# Patient Record
Sex: MALE | Race: WHITE | NOT HISPANIC OR LATINO | Employment: OTHER | URBAN - METROPOLITAN AREA
[De-identification: names, ages, dates, MRNs, and addresses within clinical notes are randomized per-mention and may not be internally consistent; named-entity substitution may affect disease eponyms.]

---

## 2018-06-05 ENCOUNTER — LAB VISIT (OUTPATIENT)
Dept: LAB | Facility: HOSPITAL | Age: 83
End: 2018-06-05
Attending: PSYCHIATRY & NEUROLOGY
Payer: MEDICARE

## 2018-06-05 ENCOUNTER — OFFICE VISIT (OUTPATIENT)
Dept: NEUROLOGY | Facility: CLINIC | Age: 83
End: 2018-06-05
Payer: MEDICARE

## 2018-06-05 VITALS
HEART RATE: 56 BPM | SYSTOLIC BLOOD PRESSURE: 138 MMHG | RESPIRATION RATE: 16 BRPM | DIASTOLIC BLOOD PRESSURE: 80 MMHG | WEIGHT: 178.13 LBS | BODY MASS INDEX: 25.5 KG/M2 | HEIGHT: 70 IN

## 2018-06-05 DIAGNOSIS — H02.401 PTOSIS OF RIGHT EYELID: Primary | ICD-10-CM

## 2018-06-05 DIAGNOSIS — H02.401 PTOSIS OF RIGHT EYELID: ICD-10-CM

## 2018-06-05 DIAGNOSIS — R20.0 LEFT ARM NUMBNESS: ICD-10-CM

## 2018-06-05 LAB
ALBUMIN SERPL BCP-MCNC: 3.6 G/DL
ALP SERPL-CCNC: 87 U/L
ALT SERPL W/O P-5'-P-CCNC: 26 U/L
ANION GAP SERPL CALC-SCNC: 8 MMOL/L
AST SERPL-CCNC: 24 U/L
BASOPHILS # BLD AUTO: 0.01 K/UL
BASOPHILS NFR BLD: 0.2 %
BILIRUB SERPL-MCNC: 0.5 MG/DL
BUN SERPL-MCNC: 15 MG/DL
CALCIUM SERPL-MCNC: 9.6 MG/DL
CHLORIDE SERPL-SCNC: 104 MMOL/L
CK SERPL-CCNC: 132 U/L
CO2 SERPL-SCNC: 29 MMOL/L
CREAT SERPL-MCNC: 1.2 MG/DL
DIFFERENTIAL METHOD: ABNORMAL
EOSINOPHIL # BLD AUTO: 0.2 K/UL
EOSINOPHIL NFR BLD: 2.8 %
ERYTHROCYTE [DISTWIDTH] IN BLOOD BY AUTOMATED COUNT: 13.6 %
ERYTHROCYTE [SEDIMENTATION RATE] IN BLOOD BY WESTERGREN METHOD: 20 MM/HR
EST. GFR  (AFRICAN AMERICAN): >60 ML/MIN/1.73 M^2
EST. GFR  (NON AFRICAN AMERICAN): 56 ML/MIN/1.73 M^2
GLUCOSE SERPL-MCNC: 94 MG/DL
HCT VFR BLD AUTO: 38.6 %
HGB BLD-MCNC: 13.4 G/DL
LYMPHOCYTES # BLD AUTO: 2.4 K/UL
LYMPHOCYTES NFR BLD: 35.9 %
MCH RBC QN AUTO: 31.8 PG
MCHC RBC AUTO-ENTMCNC: 34.7 G/DL
MCV RBC AUTO: 92 FL
MONOCYTES # BLD AUTO: 0.6 K/UL
MONOCYTES NFR BLD: 9.3 %
NEUTROPHILS # BLD AUTO: 3.4 K/UL
NEUTROPHILS NFR BLD: 51.8 %
PLATELET # BLD AUTO: 187 K/UL
PMV BLD AUTO: 9.3 FL
POTASSIUM SERPL-SCNC: 4.7 MMOL/L
PROT SERPL-MCNC: 7.3 G/DL
RBC # BLD AUTO: 4.21 M/UL
SODIUM SERPL-SCNC: 141 MMOL/L
TSH SERPL DL<=0.005 MIU/L-ACNC: 3.58 UIU/ML
WBC # BLD AUTO: 6.54 K/UL

## 2018-06-05 PROCEDURE — 36415 COLL VENOUS BLD VENIPUNCTURE: CPT

## 2018-06-05 PROCEDURE — 84443 ASSAY THYROID STIM HORMONE: CPT

## 2018-06-05 PROCEDURE — 99999 PR PBB SHADOW E&M-NEW PATIENT-LVL III: CPT | Mod: PBBFAC,,, | Performed by: PSYCHIATRY & NEUROLOGY

## 2018-06-05 PROCEDURE — 82550 ASSAY OF CK (CPK): CPT

## 2018-06-05 PROCEDURE — 85025 COMPLETE CBC W/AUTO DIFF WBC: CPT

## 2018-06-05 PROCEDURE — 99203 OFFICE O/P NEW LOW 30 MIN: CPT | Mod: PBBFAC | Performed by: PSYCHIATRY & NEUROLOGY

## 2018-06-05 PROCEDURE — 99999 PR STA SHADOW: CPT | Mod: PBBFAC,,, | Performed by: PSYCHIATRY & NEUROLOGY

## 2018-06-05 PROCEDURE — 99204 OFFICE O/P NEW MOD 45 MIN: CPT | Mod: S$PBB | Performed by: PSYCHIATRY & NEUROLOGY

## 2018-06-05 PROCEDURE — 80053 COMPREHEN METABOLIC PANEL: CPT

## 2018-06-05 PROCEDURE — 85651 RBC SED RATE NONAUTOMATED: CPT

## 2018-06-05 RX ORDER — BRIMONIDINE TARTRATE 1 MG/ML
1 SOLUTION/ DROPS OPHTHALMIC 3 TIMES DAILY
COMMUNITY

## 2018-06-05 RX ORDER — LEVOTHYROXINE SODIUM 100 UG/1
50 TABLET ORAL DAILY
COMMUNITY
End: 2018-07-03 | Stop reason: DRUGHIGH

## 2018-06-05 RX ORDER — ETODOLAC 300 MG/1
300 CAPSULE ORAL 2 TIMES DAILY PRN
COMMUNITY

## 2018-06-05 NOTE — PROGRESS NOTES
HPI: Sekou Coker is a 82 y.o. male complaining of drooping eyelid and blurred vision  This started nearly 2 weeks ago when driving.  He suddenly noted he was having blurred vision in the right eye. He noticed his right eye was drooping when he was told by eye doc and his daughter here today noted this when she saw him.   He has seen the eye doctor after this occurred and was told all was well.   Currently, his vision is improving. He continues to have right eye ptosis which does improve. He is not seeing double. There are no symptoms in the left eye.   There was no mention of abnormal pupils  He has never had any difficulty with his eyes prior.   Patient has not noted any other weakness in the face or jaw. He sometimes seems like he is straining to eat.   He had no significant medical problems- he has no HTN or DM2 and [facility]. He is aware of 50% carotid stenosis which has been stable and is followed by those providers.   He used to take ASA nightly and stopped using this after onset of symptoms  He is still driving and states he is doing well.   Review of Systems   Constitutional: Negative for fever.   HENT: Negative for nosebleeds.    Eyes: Positive for blurred vision.   Respiratory: Negative for hemoptysis.    Cardiovascular: Negative for leg swelling.   Gastrointestinal: Negative for blood in stool.   Genitourinary: Negative for hematuria.   Musculoskeletal: Negative for falls.   Skin: Negative for rash.   Neurological: Positive for headaches. Negative for sensory change and focal weakness.        Had headache initially with symptoms   Psychiatric/Behavioral: The patient does not have insomnia.          I have reviewed all of this patient's past medical and surgical histories as well as family and social histories and active allergies and medications as documented in the electronic medical record.        Exam:  Gen Appearance, well developed/nourished in no apparent  distress  CV: 2+ distal pulses with no edema or swelling  Neuro:  MS: Awake, alert, oriented to place, person, time, situation. Sustains attention. Recent/remote memory intact, Language is full to spontaneous speech/repetition/naming/comprehension. Fund of Knowledge is full  CN: Optic discs are flat with normal vasculature, PERRL, Extraoccular movements and visual fields are full. There is right eye ptosis  Normal facial strength, Hearing symmetric, Tongue and Palate are midline and strong. Shoulder Shrug symmetric and strong.  Motor: Normal bulk, tone, no abnormal movements. 5/5 strength bilateral upper/lower extremities with 2+ reflexes and bilateral plantar response  Sensory: symmetric to light touch, pain, temp, and vibration- but there Is decreased sensation in the left face and arm. Romberg negative  Cerebellar: Finger-nose,Heal-shin, Rapid alternating movements intact  Gait: Normal stance, no ataxia    Assessment/Plan: Sekou Coker is a 82 y.o. male with nearly 2 weeks of right eye visual blurring and right eye ptosis of sudden onset improving). The patient has possible left sided sensory defect on exam  I recommend:   1. MRI brain to be sure no intracranial stroke, especially brain stem CVA,  as cause  2. Resume ASA daily if no ICH   3. ESR, TSH, MG panel, CMP,CBC, CK  4. 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   RTC in 6 weeks.

## 2018-06-07 ENCOUNTER — HOSPITAL ENCOUNTER (OUTPATIENT)
Dept: RADIOLOGY | Facility: HOSPITAL | Age: 83
Discharge: HOME | End: 2018-06-07
Attending: PSYCHIATRY & NEUROLOGY
Payer: MEDICARE

## 2018-06-07 DIAGNOSIS — R20.0 LEFT ARM NUMBNESS: ICD-10-CM

## 2018-06-07 DIAGNOSIS — H02.401 PTOSIS OF RIGHT EYELID: ICD-10-CM

## 2018-06-07 PROCEDURE — 25500020 PHARM REV CODE 255: Performed by: PSYCHIATRY & NEUROLOGY

## 2018-06-07 PROCEDURE — A9585 GADOBUTROL INJECTION: HCPCS | Performed by: PSYCHIATRY & NEUROLOGY

## 2018-06-07 PROCEDURE — 70553 MRI BRAIN STEM W/O & W/DYE: CPT | Mod: 26,,, | Performed by: RADIOLOGY

## 2018-06-07 PROCEDURE — 70553 MRI BRAIN STEM W/O & W/DYE: CPT | Mod: TC

## 2018-06-07 RX ORDER — GADOBUTROL 604.72 MG/ML
8 INJECTION INTRAVENOUS
Status: COMPLETED | OUTPATIENT
Start: 2018-06-07 | End: 2018-06-07

## 2018-06-07 RX ADMIN — GADOBUTROL 8 ML: 604.72 INJECTION INTRAVENOUS at 02:06

## 2018-06-12 DIAGNOSIS — G70.00 MYASTHENIA GRAVIS: Primary | ICD-10-CM

## 2018-06-12 RX ORDER — PYRIDOSTIGMINE BROMIDE 60 MG/1
TABLET ORAL
Qty: 60 TABLET | Refills: 11 | Status: SHIPPED | OUTPATIENT
Start: 2018-06-12 | End: 2019-03-14 | Stop reason: ALTCHOICE

## 2018-06-20 LAB
ACHR BIND AB SER-SCNC: 1.9 NMOL/L (ref 0–0.4)
ACHR MOD AB/ACHR TOTAL SFR SER: 98 %
STRIA MUS AB TITR SER: ABNORMAL TITER
VGCC-N BIND AB SER-SCNC: 0 NMOL/L
VGCC-N BIND AB SER-SCNC: ABNORMAL PMOL/L
VGCC-P/Q BIND AB SER-SCNC: 0 NMOL/L

## 2018-06-25 ENCOUNTER — TELEPHONE (OUTPATIENT)
Dept: NEUROLOGY | Facility: CLINIC | Age: 83
End: 2018-06-25

## 2018-06-25 LAB — NACHR AB SER-SCNC: 0 NMOL/L

## 2018-06-25 NOTE — TELEPHONE ENCOUNTER
pyridostigmine (MESTINON) 60 mg Tab 60 tablet 11 6/12/2018  --   Sig: Take 1/2 tablet BID for 3 weeks, then may increase to 1 tablet BID if eye symptoms are not resolved.   Called to [facility] per pt request,spoke to Jose Alberto,Pharmacist.  [facility] did not have medication in stock. Script cancelled. Spoke with Claire.

## 2018-06-26 ENCOUNTER — HOSPITAL ENCOUNTER (OUTPATIENT)
Dept: RADIOLOGY | Facility: HOSPITAL | Age: 83
Discharge: HOME | End: 2018-06-26
Attending: PSYCHIATRY & NEUROLOGY
Payer: MEDICARE

## 2018-06-26 ENCOUNTER — OFFICE VISIT (OUTPATIENT)
Dept: NEUROLOGY | Facility: CLINIC | Age: 83
End: 2018-06-26
Payer: MEDICARE

## 2018-06-26 VITALS
RESPIRATION RATE: 16 BRPM | WEIGHT: 178.38 LBS | DIASTOLIC BLOOD PRESSURE: 82 MMHG | SYSTOLIC BLOOD PRESSURE: 162 MMHG | HEIGHT: 69 IN | BODY MASS INDEX: 26.42 KG/M2 | HEART RATE: 60 BPM

## 2018-06-26 DIAGNOSIS — G70.00 MYASTHENIA GRAVIS: Primary | ICD-10-CM

## 2018-06-26 DIAGNOSIS — G70.00 MYASTHENIA GRAVIS: ICD-10-CM

## 2018-06-26 PROCEDURE — 99999 PR STA SHADOW: CPT | Mod: PBBFAC,,, | Performed by: PSYCHIATRY & NEUROLOGY

## 2018-06-26 PROCEDURE — 99213 OFFICE O/P EST LOW 20 MIN: CPT | Mod: PBBFAC,25 | Performed by: PSYCHIATRY & NEUROLOGY

## 2018-06-26 PROCEDURE — 99999 PR PBB SHADOW E&M-EST. PATIENT-LVL III: CPT | Mod: PBBFAC,,, | Performed by: PSYCHIATRY & NEUROLOGY

## 2018-06-26 PROCEDURE — 71270 CT THORAX DX C-/C+: CPT | Mod: 26,,, | Performed by: RADIOLOGY

## 2018-06-26 PROCEDURE — 99214 OFFICE O/P EST MOD 30 MIN: CPT | Mod: S$PBB | Performed by: PSYCHIATRY & NEUROLOGY

## 2018-06-26 PROCEDURE — 71270 CT THORAX DX C-/C+: CPT | Mod: TC

## 2018-06-26 PROCEDURE — 25500020 PHARM REV CODE 255: Performed by: PSYCHIATRY & NEUROLOGY

## 2018-06-26 RX ADMIN — IOHEXOL 75 ML: 350 INJECTION, SOLUTION INTRAVENOUS at 10:06

## 2018-06-26 NOTE — PROGRESS NOTES
"  HPI: Sekou Coker is a 82 y.o. male complaining of drooping eyelid and blurred vision  Since the last, the patient has been found to have MG antibodies positive.  Daughter states he had some coughing, eyes watering and nose leaking. The patient states he had a cold which is improved  He is swallowing well at this time.The patient states he was coughing due to cold and daughter states this all improved well yesterday. His voice is no hypophonic.   He has had some fatigue  However, double vision and drooping eyelid have been improved this week- he has no blurred vision.   He just started mestinon yesterday.  He is driving well again now.   Review of Systems   Constitutional: Negative for fever.   HENT: Negative for nosebleeds.    Eyes: Negative for blurred vision.   Respiratory: Negative for hemoptysis.    Cardiovascular: Negative for leg swelling.   Gastrointestinal: Negative for blood in stool.   Genitourinary: Negative for hematuria.   Musculoskeletal: Negative for falls.   Skin: Negative for rash.   Neurological: Negative for headaches.   Psychiatric/Behavioral: The patient does not have insomnia.          I have reviewed all of this patient's past medical and surgical histories as well as family and social histories and active allergies and medications as documented in the electronic medical record.        Exam:  Gen Appearance, well developed/nourished in no apparent distress  CV: 2+ distal pulses with no edema or swelling  Neuro:  MS: Awake, alert, oriented to place, person, time, situation. Sustains attention. Recent/remote memory intact, Language is full to spontaneous speech/repetition/naming/comprehension. Fund of Knowledge is full  CN: Optic discs are flat with normal vasculature, PERRL, Extraoccular movements and visual fields are full. There is right eye ptosis which is greatly improved  Good "e sound" for over 15 seconds  Normal facial strength, Hearing symmetric, Tongue and Palate are midline and " strong. Shoulder Shrug symmetric and strong.  Motor: Normal bulk, tone, no abnormal movements. 5/5 strength bilateral upper/lower extremities with 2+ reflexes and bilateral plantar response  Sensory: symmetric to light touch, pain, temp, and vibration, Romberg negative  Cerebellar: Finger-nose,Heal-shin, Rapid alternating movements intact  Gait: Normal stance, no ataxia, normal heal and toe walking    MRI brain 6/2018: Age-appropriate generalized cerebral volume loss with moderate chronic microvascular ischemic change.  No evidence for an acute infarction or enhancing lesion    Labs: 6/2018 CBC showed mild anemia which is known for patient. CMP, CK, TSH, ESR all unremarkable     6/2018 MG Panel:   P/Q Type Calcium Channel Ab <=0.02 nmol/L 0.00    Comment: -------------------ADDITIONAL INFORMATION-------------------   This test was developed and its performance characteristics   determined by [facility] in a manner consistent with CLIA   requirements. This test has not been cleared or approved by   the U.S. Food and Drug Administration.    N-Type Calcium Channel Ab <=0.03 nmol/L 0.00    Comment: -------------------ADDITIONAL INFORMATION-------------------   This test was developed and its performance characteristics   determined by [facility] in a manner consistent with CLIA   requirements. This test has not been cleared or approved by   the U.S. Food and Drug Administration.    AChR Binding Ab, Serum 0.0 - 0.4 nmol/L 1.9     Comment: Repeated and verified   INTERPRETIVE INFORMATION: Acetylcholine Binding Ab   Negative ....... 0.0 - 0.4 nmol/L   Positive ....... 0.5 nmol/L or greater   Approximately 85-90 percent of patients with myasthenia   gravis (MG) express antibodies to the acetylcholine   receptor (AChR), which can be divided into binding,   blocking, and modulating antibodies. Binding antibody can   activate complement and lead to loss of AChR. Blocking   antibody may impair binding of acetylcholine to the    receptor, leading to poor muscle contraction. Modulating   antibody causes receptor endocytosis resulting in loss of   AChR expression, which correlates most closely with   clinical severity of disease. Approximately 10-15 percent   of individuals with confirmed myasthenia gravis have no   measurable binding, blocking, or modulating antibodies.   Test developed and characteristics determined by [facility]. See Compliance Statement B: [employer]/   Performed by [facility],                               [address] [place] [address] [phone]                     [email or web address], Franklin Redd MD - Lab. Director   Test Performed by:   [facility]   [address] [place] [address]    Striated Muscle Ab <1:120 titer Positive 1:51778     Comment: -------------------ADDITIONAL INFORMATION-------------------   This test was developed and its performance characteristics   determined by [facility] in a manner consistent with CLIA   requirements. This test has not been cleared or approved by   the U.S. Food and Drug Administration.   Test Performed by:   [facility] - [address] [address]    Acetylchol Modul Ab % 98     Comment: -------------------REFERENCE VALUE--------------------------   0-20% (reported as _% loss of AChR)   -------------------ADDITIONAL INFORMATION-------------------   This test was developed and its performance characteristics   determined by [facility] in a manner consistent with CLIA   requirements. This test has not been cleared or approved by   the U.S. Food and Drug Administration.    MG Lambert-Eaton Interpretation  SEE BELOW    Comment: Part of this testing algorithm includes [facility]   [employer]' ARBI: Acetylcholine Receptor (Muscle AChR)   Binding Antibody, Serum. This test is temporarily   unavailable. In the interim, this component of the   evaluation will be referred to another testing  laboratory.     Given the differences in assay methodologies, direct   comparison of quantitative results is not possible.   Interpretation of these results should be made within the   clinical context. For any test interpretative questions,   laboratory consultation is available by calling            Assessment/Plan: Sekou Coker is a 82 y.o. male with nearly right eye visual blurring and right eye ptosis of sudden onset improving. The patient has myasthenia gravis by positive antibodies.  I recommend:   1. Needs CT chest to rule out thymoma. He agrees at this point  2. Continue Mestinon only as his symptoms are greatly improved. Could consider steroids if symptoms worsen/ are disabling. Discussed signs and symptoms of worsening MG including generalized weakness, worsening vision or ptosis, difficulty with speaking, swallowing or breathing. To the ER if any symptoms. Currently he has no signs of crisis and has symptoms likely localized to the eyes only    RTC as scheduled next month

## 2018-06-27 LAB — CV2 IGG SER QL IB: NEGATIVE

## 2018-07-03 ENCOUNTER — OFFICE VISIT (OUTPATIENT)
Dept: NEUROLOGY | Facility: CLINIC | Age: 83
End: 2018-07-03
Payer: MEDICARE

## 2018-07-03 VITALS
HEIGHT: 69 IN | SYSTOLIC BLOOD PRESSURE: 172 MMHG | RESPIRATION RATE: 16 BRPM | BODY MASS INDEX: 26 KG/M2 | HEART RATE: 60 BPM | WEIGHT: 175.5 LBS | DIASTOLIC BLOOD PRESSURE: 86 MMHG

## 2018-07-03 DIAGNOSIS — R03.0 ELEVATED BP WITHOUT DIAGNOSIS OF HYPERTENSION: ICD-10-CM

## 2018-07-03 DIAGNOSIS — G70.00 MYASTHENIA GRAVIS: ICD-10-CM

## 2018-07-03 PROCEDURE — 99999 PR STA SHADOW: CPT | Mod: PBBFAC,,, | Performed by: NURSE PRACTITIONER

## 2018-07-03 PROCEDURE — 99214 OFFICE O/P EST MOD 30 MIN: CPT | Mod: S$PBB | Performed by: NURSE PRACTITIONER

## 2018-07-03 PROCEDURE — 99213 OFFICE O/P EST LOW 20 MIN: CPT | Mod: PBBFAC | Performed by: NURSE PRACTITIONER

## 2018-07-03 PROCEDURE — 99999 PR PBB SHADOW E&M-EST. PATIENT-LVL III: CPT | Mod: PBBFAC,,, | Performed by: NURSE PRACTITIONER

## 2018-07-03 RX ORDER — LEVOTHYROXINE SODIUM 50 UG/1
50 TABLET ORAL DAILY
COMMUNITY

## 2018-07-03 NOTE — PATIENT INSTRUCTIONS
Stop Plexus Slim drinks as a known side effect of this product is high blood pressure. This product is not regulated by the FDA, and ingredient dosage is variable. Any agent, which can act as a stimulant, can have exaggerated effects on older patients.      Check BP at home twice a day for two weeks, and report to PCP for further management with persistent increase over 140/90.

## 2018-07-03 NOTE — PROGRESS NOTES
"HPI: Sekou Coker is a 82 y.o. male with ptosis, blurred vision and positive MG antibodies.     He presents today for a follow up visit. He denies ptosis or visual complaints today. He completed a Chest CT since last visit, which was unremarkable for thymoma.     He denies generalized weakness, SOB, dysphagia. There is a phone note on chart with daughter's complaint of choking; however, patient denies this.     His BP is elevated today in the 170's. He has no history of HTN, but drank a Plexus Slim drink this morning before his visit.     Review of Systems   Constitutional: Negative for fever.   HENT: Negative for nosebleeds.    Eyes: Negative for blurred vision.   Respiratory: Negative for hemoptysis.    Cardiovascular: Negative for leg swelling.   Gastrointestinal: Negative for blood in stool.   Genitourinary: Negative for hematuria.   Musculoskeletal: Negative for falls.   Skin: Negative for rash.   Neurological: Negative for headaches.   Psychiatric/Behavioral: The patient does not have insomnia.        I have reviewed all of this patient's past medical and surgical histories as well as family and social histories and active allergies and medications as documented in the electronic medical record.    Exam:  Gen Appearance, well developed/nourished in no apparent distress  CV: 2+ distal pulses with no edema or swelling  Neuro:  MS: Awake, alert, oriented to place, person, time, situation. Sustains attention. Recent/remote memory intact, Language is full to spontaneous speech/repetition/naming/comprehension. Fund of Knowledge is full  CN: Optic discs are flat with normal vasculature, PERRL, Extraoccular movements and visual fields are full. There is right eye ptosis which is greatly improved  Good "e sound" for over 15 seconds  Normal facial strength, Hearing symmetric, Tongue and Palate are midline and strong. Shoulder Shrug symmetric and strong.  Motor: Normal bulk, tone, no abnormal movements. 5/5 strength " bilateral upper/lower extremities with 2+ reflexes and bilateral plantar response  Sensory: symmetric to light touch, pain, temp, and vibration, Romberg negative  Cerebellar: Finger-nose,Heal-shin, Rapid alternating movements intact  Gait: Normal stance, no ataxia, normal heal and toe walking    Imaging:   CT Chest 6/2018:  No superior mediastinal mass is identified to suggest a thymoma.    Calcified granuloma in the right lung base with calcified mediastinal lymph nodes suggesting prior granulomatous disease exposure.    Noncalcified 3 mm nodule along the left major fissure, most likely an intrafissural lymph node.    Hepatic cysts.    Probable chronic medical renal disease involving the kidneys    Left adrenal gland adenoma.    MRI brain 6/2018: Age-appropriate generalized cerebral volume loss with moderate chronic microvascular ischemic change.  No evidence for an acute infarction or enhancing lesion    Labs: 6/2018 CBC showed mild anemia which is known for patient. CMP, CK, TSH, ESR all unremarkable     6/2018 MG Panel:   P/Q Type Calcium Channel Ab <=0.02 nmol/L 0.00    Comment: -------------------ADDITIONAL INFORMATION-------------------   This test was developed and its performance characteristics   determined by [facility] in a manner consistent with CLIA   requirements. This test has not been cleared or approved by   the U.S. Food and Drug Administration.    N-Type Calcium Channel Ab <=0.03 nmol/L 0.00    Comment: -------------------ADDITIONAL INFORMATION-------------------   This test was developed and its performance characteristics   determined by [facility] in a manner consistent with CLIA   requirements. This test has not been cleared or approved by   the U.S. Food and Drug Administration.    AChR Binding Ab, Serum 0.0 - 0.4 nmol/L 1.9     Comment: Repeated and verified   INTERPRETIVE INFORMATION: Acetylcholine Binding Ab   Negative ....... 0.0 - 0.4 nmol/L   Positive ....... 0.5 nmol/L or greater    Approximately 85-90 percent of patients with myasthenia   gravis (MG) express antibodies to the acetylcholine   receptor (AChR), which can be divided into binding,   blocking, and modulating antibodies. Binding antibody can   activate complement and lead to loss of AChR. Blocking   antibody may impair binding of acetylcholine to the   receptor, leading to poor muscle contraction. Modulating   antibody causes receptor endocytosis resulting in loss of   AChR expression, which correlates most closely with   clinical severity of disease. Approximately 10-15 percent   of individuals with confirmed myasthenia gravis have no   measurable binding, blocking, or modulating antibodies.   Test developed and characteristics determined by [facility]. See Compliance Statement B: [employer] [facility]/   Performed by [facility],                               [address] [place] [address] [phone]                     [email or web address], Franklin Redd MD - Lab. Director   Test Performed by:   [facility]   [address] [place] [address]    Striated Muscle Ab <1:120 titer Positive 1:81308     Comment: -------------------ADDITIONAL INFORMATION-------------------   This test was developed and its performance characteristics   determined by [facility] in a manner consistent with CLIA   requirements. This test has not been cleared or approved by   the U.S. Food and Drug Administration.   Test Performed by:   [address] [address]    Acetylchol Modul Ab % 98     Comment: -------------------REFERENCE VALUE--------------------------   0-20% (reported as _% loss of AChR)   -------------------ADDITIONAL INFORMATION-------------------   This test was developed and its performance characteristics   determined by [facility] in a manner consistent with CLIA   requirements. This test has not been cleared or approved by   the U.S. Food and Drug  Administration.    MG Lambert-Eaton Interpretation  SEE BELOW    Comment: Part of this testing algorithm includes [facility]' ARBI: Acetylcholine Receptor (Muscle AChR)   Binding Antibody, Serum. This test is temporarily   unavailable. In the interim, this component of the   evaluation will be referred to another testing laboratory.     Given the differences in assay methodologies, direct   comparison of quantitative results is not possible.   Interpretation of these results should be made within the   clinical context. For any test interpretative questions,   laboratory consultation is available by calling      Assessment/Plan: Sekou Coker is a 82 y.o. male with ptosis, blurred vision and positive MG antibodies.    I recommend:   1. Advised to stop Plexus Slim drinks as a known side effect of this product is high blood pressure. This product is not FDA regulated, and has been shown to contain varying amounts of stimulant agents. He needs to check BP at home twice a day for two weeks, and report to PCP for further management with persistent increase over 140/90.   2. CT Chest negative for thymoma.   3. No signs of generalized MG at this time. Continue Mestinon only.   4. Discussed signs and symptoms of worsening MG including generalized weakness, worsening vision or ptosis, difficulty with speaking, swallowing or breathing. To the ER if any symptoms.     FU 3 months

## 2018-07-12 ENCOUNTER — TELEPHONE (OUTPATIENT)
Dept: NEUROLOGY | Facility: CLINIC | Age: 83
End: 2018-07-12

## 2018-07-12 NOTE — TELEPHONE ENCOUNTER
----- Message from Glenna Barlow MA sent at 2018  1:22 PM CDT -----  Contact: Self  Sekou Coker  MRN: [id number]  : 1935  PCP: [facility]  Home Phone      [phone]  Work Phone      Not on file.  Mobile          [phone]  Mobile          [phone]      MESSAGE: Patient is requesting for his records to be faxed over to [facility]. Please advise.    Phone number: [phone]  Fax number: [phone]

## 2019-03-14 ENCOUNTER — OFFICE VISIT (OUTPATIENT)
Dept: NEUROLOGY | Facility: CLINIC | Age: 84
End: 2019-03-14
Payer: MEDICARE

## 2019-03-14 ENCOUNTER — HOSPITAL ENCOUNTER (INPATIENT)
Facility: HOSPITAL | Age: 84
LOS: 1 days | Discharge: HOME HEALTH | DRG: 123 | End: 2019-03-15
Attending: EMERGENCY MEDICINE | Admitting: HOSPITALIST
Payer: MEDICARE

## 2019-03-14 ENCOUNTER — HOSPITAL ENCOUNTER (EMERGENCY)
Facility: HOSPITAL | Age: 84
Discharge: ANOTHER HOSPITAL | End: 2019-03-14
Attending: SURGERY
Payer: MEDICARE

## 2019-03-14 VITALS
TEMPERATURE: 98 F | HEART RATE: 46 BPM | SYSTOLIC BLOOD PRESSURE: 124 MMHG | WEIGHT: 171.31 LBS | OXYGEN SATURATION: 100 % | HEIGHT: 68 IN | BODY MASS INDEX: 25.96 KG/M2 | RESPIRATION RATE: 18 BRPM | DIASTOLIC BLOOD PRESSURE: 61 MMHG

## 2019-03-14 VITALS
RESPIRATION RATE: 14 BRPM | WEIGHT: 168.19 LBS | HEIGHT: 69 IN | DIASTOLIC BLOOD PRESSURE: 86 MMHG | BODY MASS INDEX: 24.91 KG/M2 | SYSTOLIC BLOOD PRESSURE: 170 MMHG | HEART RATE: 62 BPM

## 2019-03-14 DIAGNOSIS — R00.1 BRADYCARDIA: Primary | ICD-10-CM

## 2019-03-14 DIAGNOSIS — R03.0 ELEVATED BP WITHOUT DIAGNOSIS OF HYPERTENSION: Primary | ICD-10-CM

## 2019-03-14 DIAGNOSIS — R00.1 SINUS BRADYCARDIA: ICD-10-CM

## 2019-03-14 DIAGNOSIS — R42 DIZZINESS: ICD-10-CM

## 2019-03-14 DIAGNOSIS — H54.61 VISUAL LOSS, RIGHT EYE: ICD-10-CM

## 2019-03-14 DIAGNOSIS — I63.9 CEREBROVASCULAR ACCIDENT (CVA), UNSPECIFIED MECHANISM: Primary | ICD-10-CM

## 2019-03-14 DIAGNOSIS — I10 HYPERTENSION: ICD-10-CM

## 2019-03-14 DIAGNOSIS — I63.9 STROKE: ICD-10-CM

## 2019-03-14 DIAGNOSIS — R29.810 FACIAL DROOP: ICD-10-CM

## 2019-03-14 DIAGNOSIS — Z86.79 HISTORY OF HYPERTENSIVE CRISIS: ICD-10-CM

## 2019-03-14 DIAGNOSIS — I63.9 CEREBROVASCULAR ACCIDENT (CVA), UNSPECIFIED MECHANISM: ICD-10-CM

## 2019-03-14 DIAGNOSIS — H40.9 GLAUCOMA, UNSPECIFIED GLAUCOMA TYPE, UNSPECIFIED LATERALITY: ICD-10-CM

## 2019-03-14 DIAGNOSIS — G70.00 MYASTHENIA GRAVIS: ICD-10-CM

## 2019-03-14 DIAGNOSIS — E03.9 HYPOTHYROIDISM, UNSPECIFIED TYPE: ICD-10-CM

## 2019-03-14 PROBLEM — H35.052: Status: ACTIVE | Noted: 2019-03-14

## 2019-03-14 PROBLEM — H34.11 CENTRAL RETINAL ARTERY OCCLUSION OF RIGHT EYE: Status: ACTIVE | Noted: 2019-03-14

## 2019-03-14 PROBLEM — I63.131 STROKE DUE TO EMBOLISM OF RIGHT CAROTID ARTERY: Status: ACTIVE | Noted: 2019-03-14

## 2019-03-14 PROBLEM — H53.131 ACUTE LOSS OF VISION, RIGHT: Status: ACTIVE | Noted: 2019-03-14

## 2019-03-14 PROBLEM — R27.0 ATAXIA: Status: ACTIVE | Noted: 2019-03-14

## 2019-03-14 PROBLEM — R53.81 DEBILITY: Status: ACTIVE | Noted: 2019-03-14

## 2019-03-14 LAB
ALBUMIN SERPL BCP-MCNC: 3.4 G/DL
ALBUMIN SERPL BCP-MCNC: 3.7 G/DL
ALP SERPL-CCNC: 102 U/L
ALP SERPL-CCNC: 106 U/L
ALT SERPL W/O P-5'-P-CCNC: 19 U/L
ALT SERPL W/O P-5'-P-CCNC: 20 U/L
ANION GAP SERPL CALC-SCNC: 6 MMOL/L
ANION GAP SERPL CALC-SCNC: 7 MMOL/L
APTT BLDCRRT: 29.3 SEC
AST SERPL-CCNC: 22 U/L
AST SERPL-CCNC: 27 U/L
BASOPHILS # BLD AUTO: 0 K/UL
BASOPHILS # BLD AUTO: 0.01 K/UL
BASOPHILS NFR BLD: 0 %
BASOPHILS NFR BLD: 0.2 %
BILIRUB SERPL-MCNC: 0.6 MG/DL
BILIRUB SERPL-MCNC: 0.6 MG/DL
BUN SERPL-MCNC: 14 MG/DL
BUN SERPL-MCNC: 15 MG/DL
CALCIUM SERPL-MCNC: 10.1 MG/DL
CALCIUM SERPL-MCNC: 9.4 MG/DL
CHLORIDE SERPL-SCNC: 102 MMOL/L
CHLORIDE SERPL-SCNC: 104 MMOL/L
CHOLEST SERPL-MCNC: 187 MG/DL
CHOLEST/HDLC SERPL: 6 {RATIO}
CO2 SERPL-SCNC: 25 MMOL/L
CO2 SERPL-SCNC: 28 MMOL/L
CREAT SERPL-MCNC: 1.2 MG/DL
CREAT SERPL-MCNC: 1.3 MG/DL
CRP SERPL-MCNC: 3.5 MG/L
DIFFERENTIAL METHOD: ABNORMAL
DIFFERENTIAL METHOD: NORMAL
EOSINOPHIL # BLD AUTO: 0 K/UL
EOSINOPHIL # BLD AUTO: 0 K/UL
EOSINOPHIL NFR BLD: 0 %
EOSINOPHIL NFR BLD: 0.5 %
ERYTHROCYTE [DISTWIDTH] IN BLOOD BY AUTOMATED COUNT: 13 %
ERYTHROCYTE [DISTWIDTH] IN BLOOD BY AUTOMATED COUNT: 13.2 %
ERYTHROCYTE [SEDIMENTATION RATE] IN BLOOD BY WESTERGREN METHOD: 31 MM/HR
EST. GFR  (AFRICAN AMERICAN): 58 ML/MIN/1.73 M^2
EST. GFR  (AFRICAN AMERICAN): >60 ML/MIN/1.73 M^2
EST. GFR  (NON AFRICAN AMERICAN): 50 ML/MIN/1.73 M^2
EST. GFR  (NON AFRICAN AMERICAN): 55.6 ML/MIN/1.73 M^2
GLUCOSE SERPL-MCNC: 101 MG/DL
GLUCOSE SERPL-MCNC: 107 MG/DL
GLUCOSE SERPL-MCNC: 109 MG/DL (ref 70–110)
HCT VFR BLD AUTO: 38.6 %
HCT VFR BLD AUTO: 41.4 %
HDLC SERPL-MCNC: 31 MG/DL
HDLC SERPL: 16.6 %
HGB BLD-MCNC: 12.9 G/DL
HGB BLD-MCNC: 14.1 G/DL
IMM GRANULOCYTES # BLD AUTO: 0 K/UL
IMM GRANULOCYTES NFR BLD AUTO: 0 %
INR PPP: 1.1
INR PPP: 1.2
LDLC SERPL CALC-MCNC: 139.8 MG/DL
LYMPHOCYTES # BLD AUTO: 1.9 K/UL
LYMPHOCYTES # BLD AUTO: 2.4 K/UL
LYMPHOCYTES NFR BLD: 31.8 %
LYMPHOCYTES NFR BLD: 38.9 %
MCH RBC QN AUTO: 30.1 PG
MCH RBC QN AUTO: 30.3 PG
MCHC RBC AUTO-ENTMCNC: 33.4 G/DL
MCHC RBC AUTO-ENTMCNC: 34.1 G/DL
MCV RBC AUTO: 89 FL
MCV RBC AUTO: 90 FL
MONOCYTES # BLD AUTO: 0.5 K/UL
MONOCYTES # BLD AUTO: 0.6 K/UL
MONOCYTES NFR BLD: 7.8 %
MONOCYTES NFR BLD: 9.5 %
NEUTROPHILS # BLD AUTO: 3.3 K/UL
NEUTROPHILS # BLD AUTO: 3.4 K/UL
NEUTROPHILS NFR BLD: 53.1 %
NEUTROPHILS NFR BLD: 58.2 %
NONHDLC SERPL-MCNC: 156 MG/DL
NRBC BLD-RTO: 0 /100 WBC
PLATELET # BLD AUTO: 215 K/UL
PLATELET # BLD AUTO: 239 K/UL
PMV BLD AUTO: 9.4 FL
PMV BLD AUTO: 9.8 FL
POCT GLUCOSE: 103 MG/DL (ref 70–110)
POCT GLUCOSE: 112 MG/DL (ref 70–110)
POTASSIUM SERPL-SCNC: 4.4 MMOL/L
POTASSIUM SERPL-SCNC: 5.1 MMOL/L
PROT SERPL-MCNC: 7.2 G/DL
PROT SERPL-MCNC: 8 G/DL
PROTHROMBIN TIME: 11.9 SEC
PROTHROMBIN TIME: 12.5 SEC
RBC # BLD AUTO: 4.29 M/UL
RBC # BLD AUTO: 4.65 M/UL
SODIUM SERPL-SCNC: 133 MMOL/L
SODIUM SERPL-SCNC: 139 MMOL/L
TRIGL SERPL-MCNC: 81 MG/DL
TSH SERPL DL<=0.005 MIU/L-ACNC: 0.8 UIU/ML
WBC # BLD AUTO: 5.88 K/UL
WBC # BLD AUTO: 6.17 K/UL

## 2019-03-14 PROCEDURE — 80053 COMPREHEN METABOLIC PANEL: CPT

## 2019-03-14 PROCEDURE — 93010 EKG 12-LEAD: ICD-10-PCS | Mod: ,,, | Performed by: INTERNAL MEDICINE

## 2019-03-14 PROCEDURE — 93010 ELECTROCARDIOGRAM REPORT: CPT | Mod: ,,, | Performed by: INTERNAL MEDICINE

## 2019-03-14 PROCEDURE — 12000002 HC ACUTE/MED SURGE SEMI-PRIVATE ROOM

## 2019-03-14 PROCEDURE — 25000003 PHARM REV CODE 250: Performed by: SURGERY

## 2019-03-14 PROCEDURE — 82962 GLUCOSE BLOOD TEST: CPT | Mod: 91

## 2019-03-14 PROCEDURE — 85610 PROTHROMBIN TIME: CPT | Mod: 91

## 2019-03-14 PROCEDURE — 99223 1ST HOSP IP/OBS HIGH 75: CPT | Mod: GC,,, | Performed by: PSYCHIATRY & NEUROLOGY

## 2019-03-14 PROCEDURE — 99215 PR OFFICE/OUTPT VISIT, EST, LEVL V, 40-54 MIN: ICD-10-PCS | Mod: S$GLB,,, | Performed by: NURSE PRACTITIONER

## 2019-03-14 PROCEDURE — 99285 EMERGENCY DEPT VISIT HI MDM: CPT | Mod: ,,, | Performed by: EMERGENCY MEDICINE

## 2019-03-14 PROCEDURE — 99999 PR PBB SHADOW E&M-EST. PATIENT-LVL III: CPT | Mod: PBBFAC,,, | Performed by: NURSE PRACTITIONER

## 2019-03-14 PROCEDURE — 86140 C-REACTIVE PROTEIN: CPT

## 2019-03-14 PROCEDURE — 85652 RBC SED RATE AUTOMATED: CPT

## 2019-03-14 PROCEDURE — 25000003 PHARM REV CODE 250: Performed by: NURSE PRACTITIONER

## 2019-03-14 PROCEDURE — 82962 GLUCOSE BLOOD TEST: CPT | Mod: 59

## 2019-03-14 PROCEDURE — 99999 PR PBB SHADOW E&M-EST. PATIENT-LVL III: ICD-10-PCS | Mod: PBBFAC,,, | Performed by: NURSE PRACTITIONER

## 2019-03-14 PROCEDURE — 84443 ASSAY THYROID STIM HORMONE: CPT

## 2019-03-14 PROCEDURE — 85025 COMPLETE CBC W/AUTO DIFF WBC: CPT

## 2019-03-14 PROCEDURE — 99285 EMERGENCY DEPT VISIT HI MDM: CPT | Mod: 25

## 2019-03-14 PROCEDURE — 93005 ELECTROCARDIOGRAM TRACING: CPT

## 2019-03-14 PROCEDURE — 85610 PROTHROMBIN TIME: CPT

## 2019-03-14 PROCEDURE — 36415 COLL VENOUS BLD VENIPUNCTURE: CPT

## 2019-03-14 PROCEDURE — G0426 PR INPT TELEHEALTH CONSULT 50M: ICD-10-PCS | Mod: GT,G0,, | Performed by: PSYCHIATRY & NEUROLOGY

## 2019-03-14 PROCEDURE — 99215 OFFICE O/P EST HI 40 MIN: CPT | Mod: S$GLB,,, | Performed by: NURSE PRACTITIONER

## 2019-03-14 PROCEDURE — 99223 PR INITIAL HOSPITAL CARE,LEVL III: ICD-10-PCS | Mod: GC,,, | Performed by: PSYCHIATRY & NEUROLOGY

## 2019-03-14 PROCEDURE — G0426 INPT/ED TELECONSULT50: HCPCS | Mod: GT,G0,, | Performed by: PSYCHIATRY & NEUROLOGY

## 2019-03-14 PROCEDURE — 85025 COMPLETE CBC W/AUTO DIFF WBC: CPT | Mod: 91

## 2019-03-14 PROCEDURE — 25500020 PHARM REV CODE 255: Performed by: EMERGENCY MEDICINE

## 2019-03-14 PROCEDURE — 99285 EMERGENCY DEPT VISIT HI MDM: CPT | Mod: 25,27

## 2019-03-14 PROCEDURE — 85730 THROMBOPLASTIN TIME PARTIAL: CPT

## 2019-03-14 PROCEDURE — 80053 COMPREHEN METABOLIC PANEL: CPT | Mod: 91

## 2019-03-14 PROCEDURE — 99285 PR EMERGENCY DEPT VISIT,LEVEL V: ICD-10-PCS | Mod: ,,, | Performed by: EMERGENCY MEDICINE

## 2019-03-14 PROCEDURE — 80061 LIPID PANEL: CPT

## 2019-03-14 RX ORDER — IBUPROFEN 200 MG
16 TABLET ORAL
Status: DISCONTINUED | OUTPATIENT
Start: 2019-03-14 | End: 2019-03-15 | Stop reason: HOSPADM

## 2019-03-14 RX ORDER — IBUPROFEN 200 MG
24 TABLET ORAL
Status: DISCONTINUED | OUTPATIENT
Start: 2019-03-14 | End: 2019-03-15 | Stop reason: HOSPADM

## 2019-03-14 RX ORDER — CLONIDINE HYDROCHLORIDE 0.1 MG/1
0.2 TABLET ORAL
Status: COMPLETED | OUTPATIENT
Start: 2019-03-14 | End: 2019-03-14

## 2019-03-14 RX ORDER — SODIUM CHLORIDE 0.9 % (FLUSH) 0.9 %
5 SYRINGE (ML) INJECTION
Status: DISCONTINUED | OUTPATIENT
Start: 2019-03-14 | End: 2019-03-15 | Stop reason: HOSPADM

## 2019-03-14 RX ORDER — PROPARACAINE HYDROCHLORIDE 5 MG/ML
1 SOLUTION/ DROPS OPHTHALMIC
Status: COMPLETED | OUTPATIENT
Start: 2019-03-14 | End: 2019-03-14

## 2019-03-14 RX ORDER — GLUCAGON 1 MG
1 KIT INJECTION
Status: DISCONTINUED | OUTPATIENT
Start: 2019-03-14 | End: 2019-03-15 | Stop reason: HOSPADM

## 2019-03-14 RX ORDER — LEVOTHYROXINE SODIUM 50 UG/1
50 TABLET ORAL
Status: DISCONTINUED | OUTPATIENT
Start: 2019-03-15 | End: 2019-03-15 | Stop reason: HOSPADM

## 2019-03-14 RX ADMIN — PROPARACAINE HYDROCHLORIDE 1 DROP: 5 SOLUTION/ DROPS OPHTHALMIC at 04:03

## 2019-03-14 RX ADMIN — CLONIDINE HYDROCHLORIDE 0.2 MG: 0.1 TABLET ORAL at 11:03

## 2019-03-14 RX ADMIN — IOHEXOL 100 ML: 350 INJECTION, SOLUTION INTRAVENOUS at 03:03

## 2019-03-14 NOTE — SUBJECTIVE & OBJECTIVE
Past Medical History:   Diagnosis Date    Glaucoma     Hypothyroid      Past Surgical History:   Procedure Laterality Date    CARPAL TUNNEL RELEASE Right     cataract surgery       History reviewed. No pertinent family history.  Social History     Tobacco Use    Smoking status: Former Smoker     Packs/day: 1.00     Years: 28.00     Pack years: 28.00     Last attempt to quit: 1978     Years since quittin.8    Smokeless tobacco: Current User     Types: Chew   Substance Use Topics    Alcohol use: No    Drug use: No     Review of patient's allergies indicates:  No Known Allergies    Medications: I have reviewed the current medication administration record.    (Not in a hospital admission)    Review of Systems   Constitutional: Negative for chills and fever.   HENT: Negative for drooling, trouble swallowing and voice change.    Eyes: Positive for visual disturbance. Negative for pain and redness.   Respiratory: Negative for shortness of breath.    Cardiovascular: Negative for chest pain and palpitations.   Gastrointestinal: Positive for nausea. Negative for vomiting.   Musculoskeletal: Negative for gait problem.   Allergic/Immunologic: Negative for immunocompromised state.   Neurological: Positive for dizziness. Negative for weakness, numbness and headaches.   Psychiatric/Behavioral: Negative for agitation and confusion.     Objective:     Vital Signs (Most Recent):  Temp: 97.7 °F (36.5 °C) (19 1546)  Pulse: (!) 41 (19 1605)  Resp: 12 (19 1605)  BP: (!) 116/57 (19 1605)  SpO2: 97 % (19 1605)    Vital Signs Range (Last 24H):  Temp:  [97.6 °F (36.4 °C)-97.7 °F (36.5 °C)]   Pulse:  [41-62]   Resp:  [12-20]   BP: (116-201)/(57-95)   SpO2:  [97 %-100 %]     Physical Exam   Constitutional: He appears well-developed and well-nourished. No distress.   HENT:   Head: Normocephalic.   Neck: Normal range of motion.   Cardiovascular: Normal rate.   Pulmonary/Chest: Effort normal. No  respiratory distress.   Abdominal: Soft. He exhibits no distension.   Musculoskeletal: Normal range of motion. He exhibits no edema or tenderness.   Neurological: He displays no tremor. He displays no seizure activity.   Skin: Skin is warm. He is not diaphoretic.   Psychiatric: He has a normal mood and affect. His behavior is normal.     Neurological Exam:   LOC: alert  Attention Span: Good   Language: No aphasia  Articulation: slight dysarthria and slowing of speech  Orientation: Person, Place, Time   Visual Fields: Monocular visual loss: right  EOM (CN III, IV, VI): Full/intact  Pupils (CN II, III): PERRL  **APD on the R side  Facial Sensation (CN V): Normal  Facial Movement (CN VII): Symmetric facial expression    Motor: 5/5 throughout w/o drift  Cebellar: No evidence of appendicular or axial ataxia  **Normal gait. abnormal Tandem walking.   Sensation: Jacky-hypoesthesia right   **feeling slightly dull/heavy    Laboratory:  CMP:   Recent Labs   Lab 03/14/19  1500   CALCIUM 9.4   ALBUMIN 3.4*   PROT 7.2   *   K 5.1   CO2 25      BUN 15   CREATININE 1.2   ALKPHOS 102   ALT 20   AST 27   BILITOT 0.6     CBC:   Recent Labs   Lab 03/14/19  1500   WBC 6.17   RBC 4.29*   HGB 12.9*   HCT 38.6*      MCV 90   MCH 30.1   MCHC 33.4     Lipid Panel:   Recent Labs   Lab 03/14/19  1500   CHOL 187   LDLCALC 139.8   HDL 31*   TRIG 81     Coagulation:   Recent Labs   Lab 03/14/19  1118 03/14/19  1500   INR 1.1 1.2   APTT 29.3  --      TSH:   Recent Labs   Lab 03/14/19  1500   TSH 0.804       Diagnostic Results:    Brain imaging:  CTH non-contrast (3/14/19):  Generalized cerebral volume loss consistent with age  No acute intracranial pathology    Vessel Imaging:  CTA-MP (3/14/19)  No evidence of large vessel occlusion or high grade stenosis    Cardiac Evaluation:   ECG (3/14/19):  sinus bradycardia ~50  No JOURDAN or TWI  QTc 463

## 2019-03-14 NOTE — HPI
"Patient is a 83-year-old male with medical history of myasthenia gravis, glaucoma, hypothyroidism, and HTN? (on lisinopril in the past) presenting to the ED for right-sided vision loss and dizziness.  Pt initially presented to his neurologist's office this morning but was referred and later seen at [facility] in the ED where a telestroke consult was completed. Recommended transfer to [facility] for stroke eval. Pt states that his dizziness began on Saturday, 03/09, described as the room spinning around him that is made worse upon lying down. Patient reports that he had a similar episode about 40 years ago at which time he saw a neurologist but never received a full workup as it did not become a lingering problem. In addition to the patient's current dizziness, the patient reports a transient loss of vision yesterday that lasted for a few seconds, described as "looking through a kaleidoscope." This morning, he reports that around 8:00 am he lost complete vision in his right eye, only able to see light and blurry shapes out of the lateral corner of his eye. Daughter at bedside reports that he has ahd surgery for glaucoma in the past. Patient denies any past cardiac history. Patient denies any fevers, chills, headache, eye pain, CP, SOB, abdominal pain, dysuria, weakness or numbness in his extremities. Per family at bedside, patient at baseline is a very active, independent man who up until Saturday was in his usual state of health.    Upon arrival to the ED, patient was found to be afebrile, bradycardic with HR (40-50's), and hypertensive with SBP > 170. Initially evaluated with a Head CT that was negative for CVA. No LVO on CTA-MP. Vascular Neuro believed pt's acute loss of vision was moire likely an ocular pathology, but would follow up CT with an MRI for further re-assurance. Ophthalmology consulted and concerned for central retinal artery occlusion. MRI Brain pending. Initial lab work was unremarkable for any " electrolyte abnormalities and TSH was wnl. On exam, patient remains bradycardic with HR in low 40's, occasional complaining of dizziness. Per chart review, it appears that patient has been bradycardic with HR in 50-60's since July 2018, never worked up by a cardiologist. Further work-up of pt's symptomatic bradycardia in regards to a cardiac etiology including TTE w/ bubble study, U/S b/l carotids, and EP consult with possible intervention for a pacemaker placement have been ordered.

## 2019-03-14 NOTE — HPI
Patient is a 83-year-old male with medical history of glaucoma, HLD, HTN (? From medication previously) and hypothyroidism presenting to the ED for right-sided vision loss and dizziness.  Pt was seen at [facility] and telestroke consult was completed.  Recommended transfer to [facility] [place] eval.  Pt states he started with dizziness on Saturday.  Patient states when he lays down he feels like everything is spinning.  Patient states yesterday he started with right eye transient vision loss that felt like he was looking through a kaleidoscoope.  Patient states he was seen at an outside ER and had a CT scan that was negative for stroke.  Patient states today he started with right-sided painless vision loss at 8:00 a.m. this morning.  patient also endorses lower extremity weakness  Patient had CEIOL in [place] in late 2017 OU  Patient has ? Bleed in back of right eye prior to that (fam unsure of what it was)

## 2019-03-14 NOTE — ASSESSMENT & PLAN NOTE
- 82 y/o M with acute-onset R monocular vision loss, associated with several day Hx of ataxia. Transferred from [place] via telestroke for vascular neurology evaluation. Out of the window for tPA. CTH negative. No LVO on CTA-MP.  - Vascular neurology consulted in the ED: Not very consistent with a vascular territory, but should consider rare situation of embolic infarcts to posterior circulation and retinal artery. More likely ocular pathology.  --  MRI Brain WO -> Possible subcortical punctate acute infarct versus microvascular ischemic change in the left temporoparietal region. Per Vascular Neuro, most likely an incidental finding and not likely cause of patient's current symptoms  -- ophthalmology consulted for investigating ocular pathologies, appreciate assistance  -- Antithrombotics for secondary stroke prevention: Antiplatelets: Recommend Aspirin: 81 mg daily, holding until evaluated by EP for possible pacemaker.   -- Patient passed bedside swallow study, will allow patient to eat but will make him NPO at midnight pending intervention by EP team  -- Statins: Atorvastatin- 40 mg daily  -- TSH wnl, TTE w/ bubble study to assess cardiac function/status, ordered  -- VTE prophylaxis: Heparin 5000 units SQ every 8 hours, pending EP recs  -- BP parameters:  SBP <220

## 2019-03-14 NOTE — CONSULTS
[facility]  Vascular Neurology  Comprehensive Stroke Center  Tele-Consultation Note      Inpatient consult to Telemedicine-Stroke  Consult performed by: Yolanda Zimmer MD  Consult ordered by: Dee Lemus NP          Consulting Provider:    Current Providers  No providers found      Emergency Department  Spoke hospital nurse at bedside with patient assisting consultant.     Patient information was obtained from patient and spouse/SO.       Assessment/Plan:     STROKE DOCUMENTATION     Acute Stroke Times:   Acute Stroke Times   Last Known Normal Date: 03/09/19  Symptom Onset Date: 03/14/19  Symptom Onset Time: 0400  Stroke Team Called Date: 03/14/19  Stroke Team Called Time: 1127  Stroke Team Arrival Date: 03/14/19  Stroke Team Arrival Time: 1129  CT Interpretation Time: 1133    NIH Scale:  1a. Level of Consciousness: 0-->Alert, keenly responsive  1b. LOC Questions: 0-->Answers both questions correctly  1c. LOC Commands: 0-->Performs both tasks correctly  2. Best Gaze: 0-->Normal  3. Visual: 3-->Bilateral hemianopia (blind including cortical blindness)  4. Facial Palsy: 0-->Normal symmetrical movements  5a. Motor Arm, Left: 0-->No drift, limb holds 90 (or 45) degrees for full 10 secs  5b. Motor Arm, Right: 0-->No drift, limb holds 90 (or 45) degrees for full 10 secs  6a. Motor Leg, Left: 0-->No drift, leg holds 30 degree position for full 5 secs  6b. Motor Leg, Right: 0-->No drift, leg holds 30 degree position for full 5 secs  7. Limb Ataxia: 0-->Absent  8. Sensory: 1-->Mild-to-moderate sensory loss, patient feels pinprick is less sharp or is dull on the affected side, or there is a loss of superficial pain with pinprick, but patient is aware of being touched  9. Best Language: 0-->No aphasia, normal  10. Dysarthria: 0-->Normal  11. 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  Antithrombotics for secondary stroke prevention: Antiplatelets: Aspirin: 81 mg daily  Clopidogrel: 75 mg daily    Statins for secondary stroke prevention and hyperlipidemia, if present:   Statins: Atorvastatin- 40 mg daily    Aggressive risk factor modification: HTN     Rehab efforts: PT/OT/SLP to evaluate and treat    Diagnostics ordered/pending: CTA Head to assess vasculature , CTA Neck/Arch to assess vasculature, Lipid Profile to assess cholesterol levels, MRI head without contrast to assess brain parenchyma, TTE to assess cardiac function/status , TSH to assess thyroid function    VTE prophylaxis: Heparin 5000 units SQ every 8 hours    BP parameters: Infarct: No intervention, SBP <220             There were no vitals taken for this visit.  Alteplase Eligible?: No  Alteplase Recommendation: Alteplase not recommended due to Outside of treatment window   Possible Interventional Revascularization Candidate? Yes    Disposition Recommendation: transfer to system sub-hub [facility] by  ground  stat      Subjective:     History of Present Illness:  82 y/o male with acute onset of vertigo 5 days ago. Last night had episode of LOV right ey with kaleidoscope effect lasting 5 seconds. Came to ED and sent home around 0400 today. Fell asleep and awoke at 0830 with LOV right eye that persists. Denies weakness, numbness or ataxia.      Woke up with symptoms?: yes  Last known normal: Last Known Normal Date: 03/09/19      Recent bleeding noted: no  Does the patient take any Blood Thinners? no  Medications: Antiplatelets:  aspirin      Past Medical History: hypertension    Past Surgical History: no major surgeries within the last 2 weeks    Family History: no relevant history    Social History: no smoking, no  drinking, no drugs    Allergies: No Known Allergies     Review of Systems   Eyes: Positive for visual disturbance.   Neurological: Positive for numbness.   All other systems reviewed and are negative.    Objective:   Vitals:  Weight: 171 lbs, BP: 141/80 and Heart Rate: 80    CT READ: Yes  No hemmorhage. No mass effect. No early infarct signs.     Physical Exam   Constitutional: He is oriented to person, place, and time. He appears well-developed and well-nourished.   HENT:   Head: Normocephalic and atraumatic.   Eyes: EOM are normal. Pupils are equal, round, and reactive to light.   Cardiovascular: Normal rate and regular rhythm.   Pulmonary/Chest: Effort normal.   Neurological: He is alert and oriented to person, place, and time. A cranial nerve deficit and sensory deficit is present.   Vitals reviewed.      Recommended the emergency room physician to have a brief discussion with the patient and/or family if available regarding the risks and benefits of treatment, and to briefly document the occurrence of that discussion in his clinical encounter note.     The attending portion of this evaluation, treatment, and documentation was performed per Yolanda Zimmer MD via audiovisual.    Billing code:  (moderate to severe stroke, large areas of edema, some mimics)    · This patient has a critical neurological condition/illness, with high morbidity and mortality.  · There is a high probability for acute neurological change leading to clinical and possibly life-threatening deterioration requiring highest level of physician preparedness for urgent intervention.  · Care was coordinated with other physicians involved in the patient's care.  · Radiologic studies and laboratory data were reviewed and interpreted, and plan of care was re-assessed based on the results.  · Diagnosis, treatment options and prognosis may have been discussed with the patient and/or family members or caregiver.  · Further advanced medical  management and further evaluation is warranted for his care.      In your opinion, this was a: Tier 2    Consult End Time: 1201     Yolanda Zimmer MD  Comprehensive Stroke Center  Vascular Neurology   [facility]     DISPLAY PLAN FREE TEXT

## 2019-03-14 NOTE — ASSESSMENT & PLAN NOTE
"Episode of "blurred vision and unilateral facial droop" one year ago.   Negative for stroke, however Myasthenia w/u positive.  Patient started on Mestinon, but took himself off of it due to drug-induced HTN    Unlikely that current symptoms are related to MG  "

## 2019-03-14 NOTE — ASSESSMENT & PLAN NOTE
82 y/o M with acute-onset R monocular vision loss, associated with several day Hx of ataxia.  Not very consistent with a vascular territory, but should consider rare situation of embolic infarcts to posterior circulation and retinal artery. More likely ocular pathology.       - recommend obtaining MRI Brain WO to further evaluate for infarct  - recommend consulting ophthalmology as soon as possible for investigating ocular pathologies  - Antithrombotics for secondary stroke prevention: Antiplatelets: Aspirin: 81 mg daily  - Statins: Atorvastatin- 40 mg daily  - Aggressive risk factor modification: HTN  - Rehab efforts: PT/OT/SLP to evaluate and treat  - Diagnostics ordered/pending: MRI head without contrast to assess brain parenchyma, TTE to assess cardiac function/status , TSH to assess thyroid function  - VTE prophylaxis: Heparin 5000 units SQ every 8 hours  - BP parameters: Infarct: No intervention, SBP <220

## 2019-03-14 NOTE — PROGRESS NOTES
Not a stroke code per ED nurse / pt removed from portable monitor and placed to MRI table and monitor

## 2019-03-14 NOTE — CONSULTS
[facility]  Ophthalmology  Consult Note    Patient Name: Sekou Coker  MRN: [id number]  Admission Date: 3/14/2019  Hospital Length of Stay: 0 days  Attending Provider: Mervat Lama MD   Primary Care Physician: [facility]  Principal Problem:Bradycardia    Inpatient consult to Ophthalmology  Consult performed by: Luana Saeed MD  Consult ordered by: Veronica Raya NP        Subjective:     Chief Complaint:  Right eye vision loss     HPI:   Patient is a 83-year-old male with medical history of glaucoma, HLD, HTN (? From medication previously) and hypothyroidism presenting to the ED for right-sided vision loss and dizziness.  Pt was seen at [facility] and telestroke consult was completed.  Recommended transfer to [facility] for stroke eval.  Pt states he started with dizziness on Saturday.  Patient states when he lays down he feels like everything is spinning.  Patient states yesterday he started with right eye transient vision loss that felt like he was looking through a kaleidoscoope.  Patient states he was seen at an outside ER and had a CT scan that was negative for stroke.  Patient states today he started with right-sided painless vision loss at 8:00 a.m. this morning.   patient also endorses lower extremity weakness  Patient had CEIOL in [place] in late 2017 OU  Patient has ? Bleed in back of right eye prior to that (fam unsure of what it was)        No new subjective & objective note has been filed under this hospital service since the last note was generated.      Base Eye Exam     Visual Acuity (near card)       Right Left    Near sc HM 20/70          Tonometry (Tonopen, 5:53 PM)       Right Left    Pressure 12 12          Pupils       Dark Light Shape React APD    Right 3 3 Round Minimal +2    Left 3 2 Round Brisk None          Visual Fields       Right Left      Full    Restrictions Total superior nasal, inferior nasal deficiencies; Partial outer superior  temporal, inferior temporal deficiencies           Extraocular Movement       Right Left     Full, Ortho Full, Ortho          Dilation     Both eyes:  1.0% Mydriacyl; 2.5% phenyl @ 5:54 PM            Slit Lamp and Fundus Exam     External Exam       Right Left    External Normal Normal          Slit Lamp Exam       Right Left    Lids/Lashes Normal Normal    Conjunctiva/Sclera White and quiet White and quiet    Cornea Clear Clear    Anterior Chamber Deep and quiet Deep and quiet    Iris pharm dilated, no NVI pharm dilated, no NVI    Lens PCIOL PCIOL    Vitreous Normal Normal          Fundus Exam       Right Left    Disc mild pallor NVD 1/3 inferior disc    C/D Ratio .5 .5    Macula white macula w/ cherry red spot present many intermediate drusen    Vessels tortuous tortuous    Periphery Normal Normal              Assessment and Plan:     No notes have been filed under this hospital service.  Service: Ophthalmology    1. Central Retinal Arterial Occlusion, OD  -amaurosis yesterday followed by sudden onset vision loss today  -exam consistent w/ CRAO  -APD OD  -recommend MRI head, b/l carotid U/s, echo w/ bubble  -ESR/CRP to evaluate for possible GCA despite no pain  -no scalp tenderness  -hx HLD (had to stop statin d/t intolerance)  -lipid panel ordered    2. NVD OS  -possibly 2/2 old vein occlusion ~2 years ago per patient history  -no hx diabetes  -workup as per #1    3. HTN retinopathy grade 1 OU  -recommend good bp/bg/cholesterol control    4. 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 Quinones    Thank you for your consult. I will sign off. Please contact us if you have any additional questions.    Luana Saeed MD  Ophthalmology  [facility]

## 2019-03-14 NOTE — ASSESSMENT & PLAN NOTE
- EKG: sinus bradycardia, HR 50. No prior EKGs to compare  - symptomatic w/dizziness. Noted to have bradycardia dating back to 6/2018 with HR 50s  - r/o infectious etiologies: CXR, UA  - EP consult to evaluate for PPM placement, appreciate assistance

## 2019-03-14 NOTE — ASSESSMENT & PLAN NOTE
"6/2018:  AChR Binding Ab, Serum 1.9  Striated Muscle Ab Positive 1:47630  Acetylchol Modul Ab 98%    - Episode of "blurred vision and unilateral facial droop" one year ago. Negative for stroke, however Myasthenia w/u positive.  - Patient started on Mestinon, but took himself off of it due to drug-induced HTN  - Unlikely that current symptoms are related to MG per [facility] neurology      "

## 2019-03-14 NOTE — PROGRESS NOTES
MRI done pt returned to stretcher and placed to portable monitor / AAO w/ NAD and no acute change noted ED nurse to transport pt back to room #1

## 2019-03-14 NOTE — PROGRESS NOTES
HPI: Sekou Coker is a 83 y.o. male with a history of ptosis, blurred vision, and positive MG antibodies in 2018. He has hypothyroidism and glaucoma.     He presents today for an ER follow up visit. He began to experience dizziness this past Saturday, which occurred when laying down. He describes this as a room spinning sensation. He has a long history of vertigo, and this was the same as what he experienced prior. Dizziness is ongoing.     Yesterday, he experienced kaleidescope vision in his right eye when looking at the television. This lasted for only a few seconds. He drove himself to the [facility] ED, who performed a CT Head-results unknown and not resulted yet to provide this clinic a copy of when requested at his appointment today. He was given antihypertensive medication to treat an SBP in the 190's. Patient has no formal diagnosis of HTN. UA, TSH, CMP, and CBC were unremarkable, other than very mild anemia.     He woke up this morning at 8:00 with complete visual loss to his right eye, and called this office for an appointment. He denies any other complaints.     He denies any ptosis, generalized weakness, SOB, dysphagia, or speech changes.     Review of Systems   Constitutional: Negative for fever.   HENT: Negative for nosebleeds.    Eyes: Negative for blurred vision.   Respiratory: Negative for hemoptysis.    Cardiovascular: Negative for leg swelling.   Gastrointestinal: Negative for blood in stool.   Genitourinary: Negative for hematuria.   Musculoskeletal: Negative for falls.   Skin: Negative for rash.   Neurological: Negative for headaches.   Psychiatric/Behavioral: The patient does not have insomnia.        I have reviewed all of this patient's past medical and surgical histories as well as family and social histories and active allergies and medications as documented in the electronic medical record.    Exam:  Gen Appearance, well developed/nourished in no apparent distress  CV: 2+ distal pulses with  "no edema or swelling  Neuro:  MS: Awake, alert, oriented to place, person, time, situation. Sustains attention. Recent/remote memory intact, Language is full to spontaneous speech/repetition/naming/comprehension. Fund of Knowledge is full  CN: Optic discs are flat with normal vasculature, PERRL, Extraoccular movements full, but there is complete loss of vision to the right eye. No ptosis today, but there is a questionable right facial droop.   Good "e sound" for over 15 seconds  Normal facial strength, Hearing symmetric, Tongue and Palate are midline and strong. Shoulder Shrug symmetric and strong.  Motor: Normal bulk, tone, no abnormal movements. 5/5 strength bilateral upper/lower extremities with 2+ reflexes and bilateral plantar response  Sensory: symmetric to light touch, pain, temp, and vibration, Romberg mildly positive   Cerebellar: Finger-nose,Heal-shin, Rapid alternating movements intact  Gait: Normal stance, no ataxia, normal heal and toe walking    Imaging:   CT Chest 6/2018:  No superior mediastinal mass is identified to suggest a thymoma.    Calcified granuloma in the right lung base with calcified mediastinal lymph nodes suggesting prior granulomatous disease exposure.    Noncalcified 3 mm nodule along the left major fissure, most likely an intrafissural lymph node.    Hepatic cysts.    Probable chronic medical renal disease involving the kidneys    Left adrenal gland adenoma.    MRI brain 6/2018: Age-appropriate generalized cerebral volume loss with moderate chronic microvascular ischemic change.  No evidence for an acute infarction or enhancing lesion    Labs: 6/2018 CBC showed mild anemia which is known for patient. CMP, CK, TSH, ESR all unremarkable     6/2018 MG Panel:   P/Q Type Calcium Channel Ab <=0.02 nmol/L 0.00    Comment: -------------------ADDITIONAL INFORMATION-------------------   This test was developed and its performance characteristics   determined by [facility] in a manner " consistent with CLIA   requirements. This test has not been cleared or approved by   the U.S. Food and Drug Administration.    N-Type Calcium Channel Ab <=0.03 nmol/L 0.00    Comment: -------------------ADDITIONAL INFORMATION-------------------   This test was developed and its performance characteristics   determined by [facility] in a manner consistent with CLIA   requirements. This test has not been cleared or approved by   the U.S. Food and Drug Administration.    AChR Binding Ab, Serum 0.0 - 0.4 nmol/L 1.9     Comment: Repeated and verified   INTERPRETIVE INFORMATION: Acetylcholine Binding Ab   Negative ....... 0.0 - 0.4 nmol/L   Positive ....... 0.5 nmol/L or greater   Approximately 85-90 percent of patients with myasthenia   gravis (MG) express antibodies to the acetylcholine   receptor (AChR), which can be divided into binding,   blocking, and modulating antibodies. Binding antibody can   activate complement and lead to loss of AChR. Blocking   antibody may impair binding of acetylcholine to the   receptor, leading to poor muscle contraction. Modulating   antibody causes receptor endocytosis resulting in loss of   AChR expression, which correlates most closely with   clinical severity of disease. Approximately 10-15 percent   of individuals with confirmed myasthenia gravis have no   measurable binding, blocking, or modulating antibodies.   Test developed and characteristics determined by [facility]. See Compliance Statement B: Qraved/   Performed by [facility],                               [address] [place] [address] [phone]                     [email or web address], Franklin Redd MD - Lab. Director   Test Performed by:   [facility]   [address] [place] [address]    Striated Muscle Ab <1:120 titer Positive 1:51529     Comment: -------------------ADDITIONAL INFORMATION-------------------   This test was developed and its performance characteristics   determined  by [facility] in a manner consistent with CLIA   requirements. This test has not been cleared or approved by   the U.S. Food and Drug Administration.   Test Performed by:   [facility] [address] [address]    Acetylchol Modul Ab % 98     Comment: -------------------REFERENCE VALUE--------------------------   0-20% (reported as _% loss of AChR)   -------------------ADDITIONAL INFORMATION-------------------   This test was developed and its performance characteristics   determined by [facility] in a manner consistent with CLIA   requirements. This test has not been cleared or approved by   the U.S. Food and Drug Administration.    MG Lambert-Eaton Interpretation  SEE BELOW    Comment: Part of this testing algorithm includes [facility]' ARBI: Acetylcholine Receptor (Muscle AChR)   Binding Antibody, Serum. This test is temporarily   unavailable. In the interim, this component of the   evaluation will be referred to another testing laboratory.     Given the differences in assay methodologies, direct   comparison of quantitative results is not possible.   Interpretation of these results should be made within the   clinical context. For any test interpretative questions,   laboratory consultation is available by calling      Assessment/Plan: Sekou Coker is a 83 y.o. male with ptosis, blurred vision and positive MG antibodies in 2018-no current MG related complaints; however, he has acute onset of right visual loss with a questionable right facial droop at the mouth, in the context of treatment for hypertensive crisis at [facility] ED last night.     I recommend:   1. To ER NOW, given high suspicion for CVA, given complete loss of vision to the right eye and questionable right facial droop at the mouth.   2. Obtain CT Head at [facility]. He would need a repeat CT regardless, given his new complaint of visual loss since his last CT Head was done yesterday.    3. SBP in 190's treated in ER at [facility] last night, but is persistently elevated. This will need to be managed by either his PCP or through consult with Cardiology.   4. Previously advised to stop Plexus Slim drinks as a known side effect of this product is high blood pressure. This product is not FDA regulated, and has been shown to contain varying amounts of stimulant agents.   5. CT Chest negative for thymoma. No MG related complaints at this time. He is no longer taking Mestinon.   6. Discussed signs and symptoms of worsening MG including generalized weakness, worsening vision/diplopia or ptosis, difficulty with speaking, swallowing or breathing. To the ER if any symptoms.     FU 6 weeks for ER/hospital follow up

## 2019-03-14 NOTE — ED NOTES
Patient placed on pacer pads and connected to zole on monitor mode. Pt HR 38-41, Veronica GOODRICH NP notified.

## 2019-03-14 NOTE — ASSESSMENT & PLAN NOTE
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  Antithrombotics for secondary stroke prevention: Antiplatelets: Aspirin: 81 mg daily  Clopidogrel: 75 mg daily    Statins for secondary stroke prevention and hyperlipidemia, if present:   Statins: Atorvastatin- 40 mg daily    Aggressive risk factor modification: HTN     Rehab efforts: PT/OT/SLP to evaluate and treat    Diagnostics ordered/pending: CTA Head to assess vasculature , CTA Neck/Arch to assess vasculature, Lipid Profile to assess cholesterol levels, MRI head without contrast to assess brain parenchyma, TTE to assess cardiac function/status , TSH to assess thyroid function    VTE prophylaxis: Heparin 5000 units SQ every 8 hours    BP parameters: Infarct: No intervention, SBP <220

## 2019-03-14 NOTE — ED NOTES
Hourly rounding complete. Patient resting in stretcher and is in NAD at this time. Pt is awake alert and oriented x4, VSS, respirations even and unlabored. Pt denies pain at this time. Pt updated on POC, family at bedside. Bed low and locked with side rails up x2, call bell in pt reach. Pt voices no needs at this time.

## 2019-03-14 NOTE — ED PROVIDER NOTES
Encounter Date: 3/14/2019       History     Chief Complaint   Patient presents with    Eye Problem     Patient is 83-year-old white male with history of myasthenia gravis.  Started having dizziness several days ago.  He reports a momentary loss of vision in the right eye yesterday.  Was seen at 1 of the local hospitals, reportedly had CT scan done as part of a stroke evaluation, results not available at this time.  He presented to his neurology's office today, where he is an established patient, and was referred to the ED to undergo stroke workup and evaluate for possibility of an acute stroke.  At the time of his presentation he can only distinguish light and various shapes with his right eye, has no acute vision in the right eye.          Review of patient's allergies indicates:  No Known Allergies  Past Medical History:   Diagnosis Date    Glaucoma     Hypothyroid      Past Surgical History:   Procedure Laterality Date    CARPAL TUNNEL RELEASE Right      History reviewed. No pertinent family history.  Social History     Tobacco Use    Smoking status: Former Smoker     Packs/day: 1.00     Years: 28.00     Pack years: 28.00     Last attempt to quit: 1978     Years since quittin.8    Smokeless tobacco: Current User     Types: Chew   Substance Use Topics    Alcohol use: No    Drug use: Not on file     Review of Systems   Constitutional: Negative for fever.   HENT: Negative for sore throat.    Eyes: Positive for visual disturbance.   Respiratory: Negative for shortness of breath.    Cardiovascular: Negative for chest pain.   Gastrointestinal: Negative for nausea.   Genitourinary: Negative for dysuria.   Musculoskeletal: Negative for back pain.   Skin: Negative for rash.   Neurological: Positive for dizziness. Negative for weakness.   Hematological: Does not bruise/bleed easily.       Physical Exam     Initial Vitals [19 1104]   BP Pulse Resp Temp SpO2   (!) 201/95 (!) 55 16 97.6 °F (36.4 °C) 97  %      MAP       --         Physical Exam    Nursing note and vitals reviewed.  Constitutional: He appears well-developed and well-nourished.   HENT:   Head: Normocephalic and atraumatic.   Eyes: EOM are normal. Pupils are equal, round, and reactive to light.   Neck: Normal range of motion. Neck supple.   Cardiovascular: Normal rate.   Pulmonary/Chest: Breath sounds normal.   Abdominal: Soft.   Musculoskeletal: Normal range of motion.   Neurological: He is alert and oriented to person, place, and time. He has normal strength.   Skin: Skin is warm and dry.   Psychiatric: He has a normal mood and affect. Thought content normal.         ED Course   Procedures  Labs Reviewed   POCT GLUCOSE          Imaging Results    None         I have discussed the patient clinical scenario with Neurology service at [facility].  His CT scan of the head here is negative for acute findings but Neurology wants to transfer the patient for further inpatient workup and has agreed to accept the patient in transfer to [facility].  Family is discussing with the patient whether to pursue further evaluation and management as an inpatient at this time.                       Clinical Impression:       ICD-10-CM ICD-9-CM   1. Cerebrovascular accident (CVA), unspecified mechanism I63.9 434.91   2. Hypertension I10 401.9         Disposition:   Disposition: Transferred  Condition: Stable                        Iron Crouch Jr., MD  03/14/19 1220

## 2019-03-14 NOTE — CONSULTS
"[facility]  Vascular Neurology  Comprehensive Stroke Center  Consult Note    Inpatient consult to Vascular (Stroke) Neurology  Consult performed by: Denise Menard MD  Consult ordered by: Veronica Raya NP  Reason for consult: loss of vision        Assessment/Plan:     Patient is a 83 y.o. year old male with:    * Acute loss of vision, right    84 y/o M with acute-onset R monocular vision loss, associated with several day Hx of ataxia. Out of the window for tPA.  CTH negative. No LVO on CTA-MP.  Not very consistent with a vascular territory, but should consider rare situation of embolic infarcts to posterior circulation and retinal artery. More likely ocular pathology.       - recommend obtaining MRI Brain WO to further evaluate for infarct  - recommend consulting ophthalmology as soon as possible for investigating ocular pathologies  - Antithrombotics for secondary stroke prevention: Antiplatelets: Aspirin: 81 mg daily  - Statins: Atorvastatin- 40 mg daily  - Aggressive risk factor modification: HTN  - Rehab efforts: PT/OT/SLP to evaluate and treat  - Diagnostics ordered/pending: MRI head without contrast to assess brain parenchyma, TTE to assess cardiac function/status , TSH to assess thyroid function  - VTE prophylaxis: Heparin 5000 units SQ every 8 hours  - BP parameters: Infarct: No intervention, SBP <220     Myasthenia gravis    6/2018:  AChR Binding Ab, Serum 1.9  Striated Muscle Ab Positive 1:[id number]  Acetylchol Modul Ab 98%      Episode of "blurred vision and unilateral facial droop" one year ago.   Negative for stroke, however Myasthenia w/u positive.  Patient started on Mestinon, but took himself off of it due to drug-induced HTN  Unlikely that current symptoms are related to MG         STROKE DOCUMENTATION     Acute Stroke Times   Last Known Normal Date: 03/09/19  Symptom Onset Date: 03/14/19  Symptom Onset Time: 0400  Stroke Team Called Date: 03/14/19  Stroke Team Called Time: " 1127  Stroke Team Arrival Date: 03/14/19  Stroke Team Arrival Time: 1129  CT Interpretation Time: 1133    NIH Scale:  1a. Level of Consciousness: 0-->Alert, keenly responsive  1b. LOC Questions: 0-->Answers both questions correctly  1c. LOC Commands: 0-->Performs both tasks correctly  2. Best Gaze: 0-->Normal  3. Visual: 2-->Complete hemianopia  4. Facial Palsy: 0-->Normal symmetrical movements  5a. Motor Arm, Left: 0-->No drift, limb holds 90 (or 45) degrees for full 10 secs  5b. Motor Arm, Right: 0-->No drift, limb holds 90 (or 45) degrees for full 10 secs  6a. Motor Leg, Left: 0-->No drift, leg holds 30 degree position for full 5 secs  6b. Motor Leg, Right: 0-->No drift, leg holds 30 degree position for full 5 secs  7. Limb Ataxia: 0-->Absent  8. Sensory: 1-->Mild-to-moderate sensory loss, patient feels pinprick is less sharp or is dull on the affected side, or there is a loss of superficial pain with pinprick, but patient is aware of being touched  9. Best Language: 0-->No aphasia, normal  10. Dysarthria: 1-->Mild-to-moderate dysarthria, patient slurs at least some words and, at worst, can be understood with some difficulty  11. Extinction and Inattention (formerly Neglect): 0-->No abnormality  Total (NIH Stroke Scale): 4    Modified Kayleigh Score: 0  Latonia Coma Scale:    ABCD2 Score:    [id number]-JHF Score:   HAS -BLED Score:   ICH Score:   Hunt & Dhillon Classification:       Thrombolysis Candidate? No, Out of window     Interventional Revascularization Candidate?   Is the patient eligible for mechanical endovascular reperfusion (VIPIN)?  No; No large vessel occlusion    Hemorrhagic change of an Ischemic Stroke: Does this patient have an ischemic stroke with hemorrhagic changes? No     Subjective:     History of Present Illness:  The patient is an 84 y/o M with myasthenia gravis who is transferred to [facility] with sudden loss of vision in the R eye.    Patient has been experiencing dizziness and wobbliness in the last  "4-5 days. Yesterday while watching TV, his vision distorted like a "kleidoscope" that resolved after a few minutes. This morning he woke up ~8:30AM with loss of vision in the R eye. He denies pain or pressure in the eye, or associated headache or tenderness on the temple. Patient has undergone surgery for glaucoma per daughter. Patient had p/w blurred vision in the same eye with unilateral facial droop (?) one year ago, work up negative for stroke but MG panel was positive. He was started on Mestinon, however took himself off of it after a while due to rise in his BP.  Patient denies smoking, however chews tobacco. Drinks alcohol rarely.           Past Medical History:   Diagnosis Date    Glaucoma     Hypothyroid      Past Surgical History:   Procedure Laterality Date    CARPAL TUNNEL RELEASE Right     cataract surgery       History reviewed. No pertinent family history.  Social History     Tobacco Use    Smoking status: Former Smoker     Packs/day: 1.00     Years: 28.00     Pack years: 28.00     Last attempt to quit: 1978     Years since quittin.8    Smokeless tobacco: Current User     Types: Chew   Substance Use Topics    Alcohol use: No    Drug use: No     Review of patient's allergies indicates:  No Known Allergies    Medications: I have reviewed the current medication administration record.    (Not in a hospital admission)    Review of Systems   Constitutional: Negative for chills and fever.   HENT: Negative for drooling, trouble swallowing and voice change.    Eyes: Positive for visual disturbance. Negative for pain and redness.   Respiratory: Negative for shortness of breath.    Cardiovascular: Negative for chest pain and palpitations.   Gastrointestinal: Positive for nausea. Negative for vomiting.   Musculoskeletal: Negative for gait problem.   Allergic/Immunologic: Negative for immunocompromised state.   Neurological: Positive for dizziness. Negative for weakness, numbness and headaches. "   Psychiatric/Behavioral: Negative for agitation and confusion.     Objective:     Vital Signs (Most Recent):  Temp: 97.7 °F (36.5 °C) (03/14/19 1546)  Pulse: (!) 41 (03/14/19 1605)  Resp: 12 (03/14/19 1605)  BP: (!) 116/57 (03/14/19 1605)  SpO2: 97 % (03/14/19 1605)    Vital Signs Range (Last 24H):  Temp:  [97.6 °F (36.4 °C)-97.7 °F (36.5 °C)]   Pulse:  [41-62]   Resp:  [12-20]   BP: (116-201)/(57-95)   SpO2:  [97 %-100 %]     Physical Exam   Constitutional: He appears well-developed and well-nourished. No distress.   HENT:   Head: Normocephalic.   Neck: Normal range of motion.   Cardiovascular: Normal rate.   Pulmonary/Chest: Effort normal. No respiratory distress.   Abdominal: Soft. He exhibits no distension.   Musculoskeletal: Normal range of motion. He exhibits no edema or tenderness.   Neurological: He displays no tremor. He displays no seizure activity.   Skin: Skin is warm. He is not diaphoretic.   Psychiatric: He has a normal mood and affect. His behavior is normal.     Neurological Exam:   LOC: alert  Attention Span: Good   Language: No aphasia  Articulation: slight dysarthria and slowing of speech  Orientation: Person, Place, Time   Visual Fields: Monocular visual loss: right  EOM (CN III, IV, VI): Full/intact  Pupils (CN II, III): PERRL  **APD on the R side  Facial Sensation (CN V): Normal  Facial Movement (CN VII): Symmetric facial expression    Motor: 5/5 throughout w/o drift  Cebellar: No evidence of appendicular or axial ataxia  **Normal gait. abnormal Tandem walking.   Sensation: Jacky-hypoesthesia right   **feeling slightly dull/heavy    Laboratory:  CMP:   Recent Labs   Lab 03/14/19  1500   CALCIUM 9.4   ALBUMIN 3.4*   PROT 7.2   *   K 5.1   CO2 25      BUN 15   CREATININE 1.2   ALKPHOS 102   ALT 20   AST 27   BILITOT 0.6     CBC:   Recent Labs   Lab 03/14/19  1500   WBC 6.17   RBC 4.29*   HGB 12.9*   HCT 38.6*      MCV 90   MCH 30.1   MCHC 33.4     Lipid Panel:   Recent Labs    Lab 03/14/19  1500   CHOL 187   LDLCALC 139.8   HDL 31*   TRIG 81     Coagulation:   Recent Labs   Lab 03/14/19  1118 03/14/19  1500   INR 1.1 1.2   APTT 29.3  --      TSH:   Recent Labs   Lab 03/14/19  1500   TSH 0.804       Diagnostic Results:    Brain imaging:  CTH non-contrast (3/14/19):  Generalized cerebral volume loss consistent with age  No acute intracranial pathology    Vessel Imaging:  CTA-MP (3/14/19)  No evidence of large vessel occlusion or high grade stenosis    Cardiac Evaluation:   ECG (3/14/19):  sinus bradycardia ~50  No JOURDAN or TWI  QTc 463      Denise Menard MD  Comprehensive Stroke Center  Department of Vascular Neurology   [facility]

## 2019-03-14 NOTE — ED TRIAGE NOTES
Sekou Coker, a 83 y.o. male presents to the ED via EMS transfer from [facility] with CC right facial numbness, right side numbness, and right eye vision loss onset approx 8am. No other complaints at this time.    Patient identifiers verified verbally with patient and EMS and correct for Sekou Coker.    LOC/ APPEARANCE: The patient is AAOx4. Pt is speaking appropriately, no slurred speech. Pt changed into hospital gown. Continuous cardiac monitor, cont pulse ox, and auto BP cuff applied to patient. Pt is clean and well groomed. No JVD visible. Pt reports pain level of 0. Pt updated on POC. Bed low and locked with side rails up x2, call bell in pt reach. Family at bedside.  SKIN: Skin is warm dry and intact, and color is consistent with ethnicity. Capillary refill <3 seconds. No breakdown or brusing visible. Mucus membranes moist, acyanotic.  RESPIRATORY: Airway is open and patent. Respirations-spontaneous, unlabored, regular rate, equal bilaterally on inspiration and expiration. No accessory muscle use noted. Lungs clear to auscultation in all fields bilaterally anterior and posterior.   CARDIAC: Patient bradycardic. No peripheral edema noted, and patient has no c/o chest pain. Peripheral pulses present equal and strong throughout.  ABDOMEN: Soft and non-tender to palpation with no distention noted. Normoactive bowel sounds x4 quadrants. Pt has no complaints of abnormal bowel movements, denies blood in stool. Pt reports normal appetite.   NEUROLOGIC: SEE NEURO ASSESSMENT FLOW SHEET   MUSCULOSKELETAL: Spontaneous movement noted to all extremities. Hand  equal and leg strength strong +5 bilaterally.   : No complaints of frequency, burning, urgency or blood in the urine. No complaints of incontinence.

## 2019-03-14 NOTE — ED TRIAGE NOTES
Pt presents with C/O rt sided visual loss that began at 8:30 this am. Was seen at [facility] yesterday for dizziness

## 2019-03-14 NOTE — ASSESSMENT & PLAN NOTE
84 y/o M with acute-onset R monocular vision loss, associated with several day Hx of ataxia. Out of the window for tPA.  CTH negative. No LVO on CTA-MP.  Ophthalmology consulted and found to have cherry red spot on in the fovea on fundoscopic examination.  Also punctate stroke seen in left parietal lobe.  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    30 day cardiac event monitor ordered        - Antithrombotics for secondary stroke prevention: Antiplatelets: Aspirin: 81 mg daily and plavix 75mq qd (can d/c plavix after 30 days)  - Statins: Atorvastatin- 40 mg daily  - Aggressive risk factor modification: HTN  - Rehab efforts: PT/OT/SLP to evaluate and treat  - Diagnostics ordered/pending:none  - VTE prophylaxis: Heparin 5000 units SQ every 8 hours  - BP parameters: Infarct: No intervention, SBP <220     Follow up in stroke clinic within 4-6 weeks, ophthalmology and outpatient OT with driving evaluation before driving if vision would to improve.

## 2019-03-14 NOTE — ED PROVIDER NOTES
Encounter Date: 3/14/2019    SCRIBE #1 NOTE: I, Melonie Blankenship, am scribing for, and in the presence of,  Dr. Chavez. I have scribed the following portions of the note - the APC attestation.       History     Chief Complaint   Patient presents with    Blurred Vision     Pt presents with visual changes in right eye and numbness to the right side of his face. Pt also had slurred speech that began at 0830 this morning. Pt reports dizziness began yesterday.      Patient is a 83-year-old male with medical history of glaucoma and hypothyroidism presenting to the ED for right-sided vision loss and dizziness.  Pt was seen at [facility] and telestroke consult was completed.  [place] [facility] [place]  Pt states he started with dizziness on Saturday.  Patient states when he lays down he feels like everything is spinning.  Patient states yesterday he started with right eye pain.  Patient states he was seen at an outside ER and had a CT scan that was negative for stroke.  Patient states today he started with right-sided vision loss at 8:00 a.m. this morning.  Patient denies any further pain but does endorse continued dizziness.  Patient does have right-sided facial droop as well as decreased sensation on the right side.      The history is provided by the patient and a relative.     Review of patient's allergies indicates:  No Known Allergies  Past Medical History:   Diagnosis Date    Glaucoma     Hypothyroid      Past Surgical History:   Procedure Laterality Date    CARPAL TUNNEL RELEASE Right     cataract surgery       History reviewed. No pertinent family history.  Social History     Tobacco Use    Smoking status: Former Smoker     Packs/day: 1.00     Years: 28.00     Pack years: 28.00     Last attempt to quit: 1978     Years since quittin.8    Smokeless tobacco: Current User     Types: Chew   Substance Use Topics    Alcohol use: No    Drug use: No     Review of Systems    Constitutional: Positive for activity change. Negative for appetite change, chills, fatigue and fever.   HENT: Negative for congestion, sinus pressure, sinus pain and sore throat.    Eyes: Positive for pain ( Right-sided yesterday) and visual disturbance ( Right-sided blurred vision). Negative for photophobia, discharge, redness and itching.   Respiratory: Negative for cough, chest tightness, shortness of breath, wheezing and stridor.    Cardiovascular: Negative for chest pain, palpitations and leg swelling.   Gastrointestinal: Negative for abdominal pain, constipation, diarrhea, nausea and vomiting.   Genitourinary: Negative for decreased urine volume, difficulty urinating, dysuria, hematuria and urgency.   Musculoskeletal: Negative for arthralgias, back pain, myalgias, neck pain and neck stiffness.   Skin: Negative for color change, pallor, rash and wound.   Allergic/Immunologic: Negative for immunocompromised state.   Neurological: Positive for dizziness ( Since Saturday) and facial asymmetry ( Since this morning). Negative for tremors, syncope, weakness, light-headedness, numbness and headaches.   Hematological: Does not bruise/bleed easily.       Physical Exam     Initial Vitals [03/14/19 1455]   BP Pulse Resp Temp SpO2   (!) 178/72 (!) 50 16 97.7 °F (36.5 °C) 99 %      MAP       --         Physical Exam    Nursing note and vitals reviewed.  Constitutional: Vital signs are normal. He appears well-developed and well-nourished. He is cooperative. He does not have a sickly appearance. He does not appear ill. No distress.   HENT:   Head: Normocephalic and atraumatic.   Right Ear: Tympanic membrane and ear canal normal.   Left Ear: Tympanic membrane and ear canal normal.   Mouth/Throat: Uvula is midline, oropharynx is clear and moist and mucous membranes are normal.   Eyes: Conjunctivae, EOM and lids are normal. Pupils are equal, round, and reactive to light.   R eye IOP of 10.    Neck: Trachea normal, normal  range of motion, full passive range of motion without pain and phonation normal. Neck supple. No spinous process tenderness and no muscular tenderness present. No JVD present.   Cardiovascular: Regular rhythm. Bradycardia present.    Pulses:       Radial pulses are 2+ on the right side, and 2+ on the left side.        Dorsalis pedis pulses are 2+ on the right side, and 2+ on the left side.   Pulmonary/Chest: Effort normal and breath sounds normal.   Abdominal: Soft. Normal appearance and bowel sounds are normal. There is no tenderness. There is no rigidity, no rebound and no guarding.   Musculoskeletal: Normal range of motion.   Neurological: He is alert and oriented to person, place, and time. He has normal strength. A sensory deficit ( decreased sensation to right side) is present. GCS eye subscore is 4. GCS verbal subscore is 5. GCS motor subscore is 6.   Skin: Skin is warm, dry and intact. Capillary refill takes 2 to 3 seconds. Ecchymosis ( scattered) noted. No rash noted. No cyanosis. There is pallor. Nails show no clubbing.         ED Course   Procedures  Labs Reviewed   CBC W/ AUTO DIFFERENTIAL - Abnormal; Notable for the following components:       Result Value    RBC 4.29 (*)     Hemoglobin 12.9 (*)     Hematocrit 38.6 (*)     All other components within normal limits   COMPREHENSIVE METABOLIC PANEL - Abnormal; Notable for the following components:    Sodium 133 (*)     Albumin 3.4 (*)     Anion Gap 6 (*)     eGFR if non  55.6 (*)     All other components within normal limits   LIPID PANEL - Abnormal; Notable for the following components:    HDL 31 (*)     HDL/Chol Ratio 16.6 (*)     Total Cholesterol/HDL Ratio 6.0 (*)     All other components within normal limits   POCT GLUCOSE - Abnormal; Notable for the following components:    POCT Glucose 112 (*)     All other components within normal limits   PROTIME-INR   TSH   POCT GLUCOSE, HAND-HELD DEVICE        ECG Results          ECG 12 lead (In  process)  Result time 03/14/19 15:54:00    In process by Interface, Lab In [facility] (03/14/19 15:54:00)                 Narrative:    Test Reason : (Not Selected)    Vent. Rate : 050 BPM     Atrial Rate : 050 BPM     P-R Int : 206 ms          QRS Dur : 076 ms      QT Int : 508 ms       P-R-T Axes : 059 062 050 degrees     QTc Int : 463 ms    Sinus bradycardia  Otherwise normal ECG  When compared with ECG of 14-MAR-2019 11:51,  No significant change was found    Referred By: JCARLOS YANG           Confirmed By:                             Imaging Results          X-Ray Chest AP Portable (Final result)  Result time 03/14/19 16:08:58    Final result by Johnny Hardin MD (03/14/19 16:08:58)                 Impression:      No radiographic acute intrathoracic process seen.      Electronically signed by: Johnny Hardin MD  Date:    03/14/2019  Time:    16:08             Narrative:    EXAMINATION:  XR CHEST AP PORTABLE    CLINICAL HISTORY:  Stroke;    TECHNIQUE:  Single frontal view of the chest was performed.    COMPARISON:  CT thorax 06/26/2018    FINDINGS:  Monitoring leads overlie the chest.  Cardiomediastinal silhouette is midline and within normal limits.  Chronic mild nonspecific elevation of the right hemidiaphragm.  Few scattered linear opacities consistent with subsegmental scarring versus atelectasis.  The lungs are otherwise well expanded without focal consolidation, pleural effusion or pneumothorax.  No acute osseous process seen.  PA and lateral views can be obtained.                               CTA STROKE MULTI-PHASE (Final result)     Abnormal  Result time 03/14/19 15:53:28    Final result by Darshan Tilley MD (03/14/19 15:53:28)                 Impression:      No evidence of high-grade stenosis or major vessel occlusion.    Moderate supratentorial findings of chronic microvascular ischemic disease.    3 mm micronodule in the upper pole of right lung.  For a solid nodule <6 mm, Fleischner Society 2017  guidelines recommend no routine follow up for a low risk patient, or follow-up with non-contrast chest CT at 12 months in a high risk patient.    Multilevel facet degeneration is present throughout the cervical spine with multilevel predominantly left-sided foraminal stenoses.    This report was flagged in Epic as containing an incidental finding.    Electronically signed by resident: Kelly Woodruff  Date:    03/14/2019  Time:    15:23    Electronically signed by: Darshan Tilley MD  Date:    03/14/2019  Time:    15:53             Narrative:    EXAMINATION:  CTA STROKE MULTI-PHASE    CLINICAL HISTORY:  Stroke    TECHNIQUE:  Non contrast low dose axial images were obtained thought the head. CT angiogram was performed from the level of the joshua to the top of the head following the IV administration of 100mL of Omnipaque 350.   Sagittal and coronal reconstructions and maximum intensity projection reconstructions were performed. Arterial stenosis percentages are based on NASCET measurement criteria.  Two additional phases of immediate post-contrast CTA images were performed through the head alone.    COMPARISON:  CT head 03/14/2019.  CT chest 06/26/2018    FINDINGS:  Intracranial compartment: Prominence of the ventricles and sulci compatible with mild generalized cerebral volume loss.  No hydrocephalus. No extra-axial blood or fluid collections. Moderate degree of patchy and confluent hypoattenuation in the supratentorial periventricular and subcortical white matter most likely relates to sequelae of chronic microvascular ischemic disease.  No parenchymal mass, hemorrhage, edema, or major vascular distribution infarct.    Skull/Extracranial Contents (limited evaluation): No fracture. Mastoid air cells and paranasal sinuses are essentially clear.    Non-Vascular Structures of the Neck/Thoracic Inlet (limited evaluation): 3 mm micronodule in the upper lobe of the right lung is unchanged.  Multilevel facet degeneration is  present throughout the cervical spine with multilevel predominantly left-sided foraminal stenoses.    Aorta: Normal 3 vessel arch.    Extracranial carotid circulation: Minimal atherosclerotic calcification at the carotid bulbs.  No hemodynamically significant stenosis, aneurysmal dilatation, or dissection.    Extracranial vertebral circulation: No hemodynamically significant stenosis, aneurysmal dilatation, or dissection.    Intracranial Arteries: Few air foci in the cavernous sinus are likely related to IV injection.  No focal high-grade stenosis, occlusion, or aneurysm.    Venous structures (limited evaluation): Unremarkable.                                   Medical Decision Making:   History:   I obtained history from: EMS provider and someone other than patient.       <> Summary of History: As per patient's family patient started with dizziness on Saturday.  Patient states dizziness worse when lying down.  Patient states he feels like everything is spinning.  Patient states yesterday he had right eye pain in which he went to an outside ED to get evaluated.  Patient states workup was negative and he was discharged home.  Patient family states this morning he started with loss of vision in right eye.  [facility]'s was seen at [facility] and tele stroke was activated.  Old Medical Records: I decided to obtain old medical records.  Initial Assessment:   Emergent evaluation of a 82 yo male patient presenting to the ER with chief complaint of dizziness and loss of vision to right eye.   As per patient and patient's family patient started with dizziness on Saturday.  Patient states dizziness worse when lying down.  Patient states he feels like everything is spinning.  Patient states yesterday he had right eye pain in which he went to an outside ED to get evaluated.  Patient states workup was negative and he was discharged home.  Patient family states this morning he started with loss of vision in right eye.  Patient's  was seen at [facility] and tele stroke was activated.  Patient was transferred to [facility] the ED for stroke consult.  On exam patient A&O x3. Pupils equal round reactive 3-2 mm.  No nystagmus noted.  Strength 5/5 in all extremities. Right-sided facial droop noted.  Breath sounds clear bilaterally.  Abdomen soft and nontender.  Decreased sensation to right upper and right lower extremity.  Patient denies any weakness.  Patient states he is only able to see shapes out of his right eye.  Patient is pale on exam with cap refill less than 3 sec.  Scattered ecchymosis noted.  Differential Diagnosis:   Differential diagnoses include but are not limited to Hypothyroidism, myasthenia gravis, electrolyte abnormality, vertigo, labyrinthitis, benign positional vertigo, intracranial mass, urinary tract infection, infectious issues, dehydration, medication reaction, CVA, TIA.  Independently Interpreted Test(s):   I have ordered and independently interpreted X-rays - see prior notes.  I have ordered and independently interpreted EKG Reading(s) - see prior notes  Clinical Tests:   Lab Tests: Ordered and Reviewed  The following lab test(s) were unremarkable: CBC and CMP       <> Summary of Lab: Patient's CBC unremarkable.  H&H stable at 12.9 and 38.6.  PT INR within normal limits.  TSH within normal limits at 0.804.  Point of care glucose within normal limits at 112.  Radiological Study: Ordered and Reviewed  Medical Tests: Ordered and Reviewed  ED Management:   I will contact vascular neurology, get labs, get CTA multiphase as requested by stroke team and reassess.  I discussed the care of this patient with my Supervising Physician.      CT and CTA multiphase reviewed with vascular Neurology.  No acute stroke noted.  They recommend MRI an ophtho consult.  MRI order placed.    IOP of right eye-10.  Pt continues to endorse loss of vision.  Can only see lights and shapes.  Optho consulted and will be down to see pt.      Pt  continues to be bradycardic in the ED.  Will admit to hospital medicine for further evaluation of bradycardia, dizziness and vision loss.    Other:   I have discussed this case with another health care provider.       <> Summary of the Discussion: Vascular Neurology team at bedside.  Awaiting further recommendations.    Additional MDM:     NIH Stroke Scale:   Interval = baseline (upon arrival/admit)  Level of consciousness = 0 - alert  LOC questions = 0 - answers both correctly  LOC commands = 0 - performs both correctly  Best gaze = 1 - partial gaze palsy  Visual = 1 - partial hemianopia  Facial palsy = 1 - minor  Motor left arm =  0 - no drift  Motor right arm =  0 - no drift  Motor left leg = 0 - no drift  Motor right leg =  0 - no drift  Limb ataxia = 0 - absent  Sensory = 1 - mild to moderate loss  Best language = 0 - no aphasia  Dysarthria = 1 - mild to moderate dysarthria  Extinction and inattention = 0 - no neglect  NIH Stroke Scale Total = 5           Attending Attestation:     Physician Attestation Statement for NP/PA:   I have conducted a face to face encounter with this patient in addition to the NP/PA, due to Medical Complexity    Other NP/PA Attestation Additions:      Medical Decision Making: ED Course    4:54 PM    Performed a bedside US that didn't show any retinal detachment.                     Clinical Impression:       ICD-10-CM ICD-9-CM   1. Bradycardia R00.1 427.89   2. Stroke I63.9 434.91   3. Dizziness R42 780.4         Disposition:   Disposition: Admitted  Condition: Stable                        Veronica Raya NP  03/14/19 0216

## 2019-03-15 VITALS
SYSTOLIC BLOOD PRESSURE: 162 MMHG | WEIGHT: 171 LBS | BODY MASS INDEX: 25.91 KG/M2 | RESPIRATION RATE: 19 BRPM | TEMPERATURE: 99 F | DIASTOLIC BLOOD PRESSURE: 76 MMHG | OXYGEN SATURATION: 97 % | HEART RATE: 58 BPM | HEIGHT: 68 IN

## 2019-03-15 PROBLEM — I63.412 EMBOLIC STROKE INVOLVING LEFT MIDDLE CEREBRAL ARTERY: Status: ACTIVE | Noted: 2019-03-15

## 2019-03-15 PROBLEM — H34.11: Status: ACTIVE | Noted: 2019-03-14

## 2019-03-15 PROBLEM — G93.6 CYTOTOXIC CEREBRAL EDEMA: Status: ACTIVE | Noted: 2019-03-15

## 2019-03-15 LAB
ALBUMIN SERPL BCP-MCNC: 3.3 G/DL
ALP SERPL-CCNC: 101 U/L
ALT SERPL W/O P-5'-P-CCNC: 18 U/L
ANION GAP SERPL CALC-SCNC: 6 MMOL/L
ASCENDING AORTA: 3.02 CM
AST SERPL-CCNC: 19 U/L
AV INDEX (PROSTH): 0.91
AV MEAN GRADIENT: 3.86 MMHG
AV PEAK GRADIENT: 8.53 MMHG
AV VALVE AREA: 3.18 CM2
AV VELOCITY RATIO: 0.75
BASOPHILS # BLD AUTO: 0.01 K/UL
BASOPHILS NFR BLD: 0.1 %
BILIRUB SERPL-MCNC: 0.5 MG/DL
BSA FOR ECHO PROCEDURE: 1.93 M2
BUN SERPL-MCNC: 18 MG/DL
CALCIUM SERPL-MCNC: 9.6 MG/DL
CHLORIDE SERPL-SCNC: 104 MMOL/L
CO2 SERPL-SCNC: 29 MMOL/L
CREAT SERPL-MCNC: 1.3 MG/DL
CV ECHO LV RWT: 0.47 CM
DIFFERENTIAL METHOD: ABNORMAL
DOP CALC AO PEAK VEL: 1.46 M/S
DOP CALC AO VTI: 28.54 CM
DOP CALC LVOT AREA: 3.49 CM2
DOP CALC LVOT DIAMETER: 2.11 CM
DOP CALC LVOT PEAK VEL: 1.1 M/S
DOP CALC LVOT STROKE VOLUME: 90.8 CM3
DOP CALCLVOT PEAK VEL VTI: 25.98 CM
E WAVE DECELERATION TIME: 243.54 MSEC
E/A RATIO: 0.66
E/E' RATIO: 13
ECHO LV POSTERIOR WALL: 0.99 CM (ref 0.6–1.1)
EOSINOPHIL # BLD AUTO: 0 K/UL
EOSINOPHIL NFR BLD: 0 %
ERYTHROCYTE [DISTWIDTH] IN BLOOD BY AUTOMATED COUNT: 12.9 %
EST. GFR  (AFRICAN AMERICAN): 58.3 ML/MIN/1.73 M^2
EST. GFR  (NON AFRICAN AMERICAN): 50.5 ML/MIN/1.73 M^2
FRACTIONAL SHORTENING: 32 % (ref 28–44)
GLUCOSE SERPL-MCNC: 110 MG/DL
HCT VFR BLD AUTO: 39 %
HGB BLD-MCNC: 13 G/DL
IMM GRANULOCYTES # BLD AUTO: 0 K/UL
IMM GRANULOCYTES NFR BLD AUTO: 0 %
INTERVENTRICULAR SEPTUM: 0.98 CM (ref 0.6–1.1)
IVRT: 0.1 MSEC
LA MAJOR: 5.28 CM
LA MINOR: 5.22 CM
LA WIDTH: 3.24 CM
LEFT ATRIUM SIZE: 3.38 CM
LEFT ATRIUM VOLUME INDEX: 25.6 ML/M2
LEFT ATRIUM VOLUME: 48.87 CM3
LEFT INTERNAL DIMENSION IN SYSTOLE: 2.85 CM (ref 2.1–4)
LEFT VENTRICLE DIASTOLIC VOLUME INDEX: 40.66 ML/M2
LEFT VENTRICLE DIASTOLIC VOLUME: 77.75 ML
LEFT VENTRICLE MASS INDEX: 69.7 G/M2
LEFT VENTRICLE SYSTOLIC VOLUME INDEX: 16.1 ML/M2
LEFT VENTRICLE SYSTOLIC VOLUME: 30.82 ML
LEFT VENTRICULAR INTERNAL DIMENSION IN DIASTOLE: 4.18 CM (ref 3.5–6)
LEFT VENTRICULAR MASS: 133.37 G
LV LATERAL E/E' RATIO: 11.14
LV SEPTAL E/E' RATIO: 15.6
LYMPHOCYTES # BLD AUTO: 3 K/UL
LYMPHOCYTES NFR BLD: 40.1 %
MAGNESIUM SERPL-MCNC: 2.2 MG/DL
MCH RBC QN AUTO: 30.2 PG
MCHC RBC AUTO-ENTMCNC: 33.3 G/DL
MCV RBC AUTO: 91 FL
MONOCYTES # BLD AUTO: 0.7 K/UL
MONOCYTES NFR BLD: 9.7 %
MV PEAK A VEL: 1.18 M/S
MV PEAK E VEL: 0.78 M/S
NEUTROPHILS # BLD AUTO: 3.8 K/UL
NEUTROPHILS NFR BLD: 50.1 %
NRBC BLD-RTO: 0 /100 WBC
PHOSPHATE SERPL-MCNC: 2.9 MG/DL
PISA TR MAX VEL: 2.69 M/S
PLATELET # BLD AUTO: 216 K/UL
PMV BLD AUTO: 9.8 FL
POTASSIUM SERPL-SCNC: 4.5 MMOL/L
PROT SERPL-MCNC: 7.2 G/DL
PULM VEIN S/D RATIO: 1.21
PV PEAK D VEL: 0.47 M/S
PV PEAK S VEL: 0.57 M/S
RA MAJOR: 5.14 CM
RA PRESSURE: 3 MMHG
RA WIDTH: 3.5 CM
RBC # BLD AUTO: 4.3 M/UL
RIGHT VENTRICULAR END-DIASTOLIC DIMENSION: 4.03 CM
RV TISSUE DOPPLER FREE WALL SYSTOLIC VELOCITY 1 (APICAL 4 CHAMBER VIEW): 13.11 M/S
SINUS: 2.96 CM
SODIUM SERPL-SCNC: 139 MMOL/L
STJ: 2.46 CM
TDI LATERAL: 0.07
TDI SEPTAL: 0.05
TDI: 0.06
TR MAX PG: 28.94 MMHG
TRICUSPID ANNULAR PLANE SYSTOLIC EXCURSION: 2.14 CM
TV REST PULMONARY ARTERY PRESSURE: 32 MMHG
WBC # BLD AUTO: 7.59 K/UL

## 2019-03-15 PROCEDURE — 80053 COMPREHEN METABOLIC PANEL: CPT

## 2019-03-15 PROCEDURE — 99233 PR SUBSEQUENT HOSPITAL CARE,LEVL III: ICD-10-PCS | Mod: ,,, | Performed by: PSYCHIATRY & NEUROLOGY

## 2019-03-15 PROCEDURE — 99238 PR HOSPITAL DISCHARGE DAY,<30 MIN: ICD-10-PCS | Mod: ,,, | Performed by: HOSPITALIST

## 2019-03-15 PROCEDURE — 84100 ASSAY OF PHOSPHORUS: CPT

## 2019-03-15 PROCEDURE — 85025 COMPLETE CBC W/AUTO DIFF WBC: CPT

## 2019-03-15 PROCEDURE — 99233 SBSQ HOSP IP/OBS HIGH 50: CPT | Mod: ,,, | Performed by: PSYCHIATRY & NEUROLOGY

## 2019-03-15 PROCEDURE — 83735 ASSAY OF MAGNESIUM: CPT

## 2019-03-15 PROCEDURE — 97127 HC THERAPEUTIC INTVTN, COGN FUNCTION - OT: CPT

## 2019-03-15 PROCEDURE — 97161 PT EVAL LOW COMPLEX 20 MIN: CPT

## 2019-03-15 PROCEDURE — 97165 OT EVAL LOW COMPLEX 30 MIN: CPT

## 2019-03-15 PROCEDURE — 99238 HOSP IP/OBS DSCHRG MGMT 30/<: CPT | Mod: ,,, | Performed by: HOSPITALIST

## 2019-03-15 RX ORDER — ASPIRIN 325 MG
325 TABLET ORAL DAILY
Qty: 90 TABLET | Refills: 3 | COMMUNITY
Start: 2019-03-15 | End: 2019-03-15 | Stop reason: HOSPADM

## 2019-03-15 RX ORDER — LISINOPRIL 5 MG/1
5 TABLET ORAL DAILY
Qty: 90 TABLET | Refills: 3 | Status: SHIPPED | OUTPATIENT
Start: 2019-03-15 | End: 2019-03-15 | Stop reason: HOSPADM

## 2019-03-15 RX ORDER — AMLODIPINE BESYLATE 5 MG/1
5 TABLET ORAL DAILY
Qty: 90 TABLET | Refills: 1 | Status: SHIPPED | OUTPATIENT
Start: 2019-03-15 | End: 2020-03-14

## 2019-03-15 RX ORDER — AMLODIPINE BESYLATE 5 MG/1
5 TABLET ORAL DAILY
Qty: 90 TABLET | Refills: 1 | Status: SHIPPED | OUTPATIENT
Start: 2019-03-15 | End: 2019-03-15 | Stop reason: SDUPTHER

## 2019-03-15 RX ORDER — AMLODIPINE BESYLATE 5 MG/1
2.5 TABLET ORAL DAILY
Qty: 90 TABLET | Refills: 1 | Status: SHIPPED | OUTPATIENT
Start: 2019-03-15 | End: 2019-03-15 | Stop reason: SDUPTHER

## 2019-03-15 RX ORDER — ASPIRIN 81 MG/1
81 TABLET ORAL DAILY
Qty: 90 TABLET | Refills: 1 | COMMUNITY
Start: 2019-03-15 | End: 2020-03-14

## 2019-03-15 RX ORDER — CLOPIDOGREL BISULFATE 75 MG/1
75 TABLET ORAL DAILY
Qty: 30 TABLET | Refills: 0 | Status: SHIPPED | OUTPATIENT
Start: 2019-03-15 | End: 2020-03-14

## 2019-03-15 RX ORDER — CLOPIDOGREL BISULFATE 75 MG/1
75 TABLET ORAL DAILY
Qty: 90 TABLET | Refills: 3 | Status: SHIPPED | OUTPATIENT
Start: 2019-03-15 | End: 2019-03-15 | Stop reason: SDUPTHER

## 2019-03-15 RX ORDER — ROSUVASTATIN CALCIUM 20 MG/1
20 TABLET, COATED ORAL DAILY
Qty: 90 TABLET | Refills: 1 | Status: SHIPPED | OUTPATIENT
Start: 2019-03-15 | End: 2020-03-14

## 2019-03-15 NOTE — PROGRESS NOTES
Procedure explained. Bubble study done x 3 with and without valsalva via right a/c sl. Tolerated well. Sl flushed before and after with 10 cc ns.

## 2019-03-15 NOTE — PLAN OF CARE
SW sent orders, Facesheet and H&P to [facility](Rebecca [phone] opt [phone])     03/15/19 1146   Post-Acute Status   Post-Acute Authorization Home Health/Hospice  (PHN)   Home Health/Hospice Status Referrals Sent    so they can set up pt's home health.      Sylwia Wilson, DIYA  [id number]

## 2019-03-15 NOTE — H&P
"[facility] Medicine  History & Physical    Patient Name: Sekou Coker  MRN: [id number]  Admission Date: 3/14/2019  Attending Physician: Mervat Lama MD   Primary Care Provider: [facility] Medicine Team: [facility] MED 3 Shar Alcaraz MD     Patient information was obtained from patient, relative(s) and ER records.     Subjective:     Principal Problem:Bradycardia    Chief Complaint:   Chief Complaint   Patient presents with    Blurred Vision     Pt presents with visual changes in right eye and numbness to the right side of his face. Pt also had slurred speech that began at 0830 this morning. Pt reports dizziness began yesterday.         HPI: Patient is a 83-year-old male with medical history of myasthenia gravis, glaucoma, hypothyroidism, and HTN? (on lisinopril in the past) presenting to the ED for right-sided vision loss and dizziness.  Pt initially presented to his neurologist's office this morning but was referred and later seen at formerly [facility] in the ED where a telestroke consult was completed. Recommended transfer to [facility] for stroke eval. Pt states that his dizziness began on Saturday, 03/09, described as the room spinning around him that is made worse upon lying down. Patient reports that he had a similar episode about 40 years ago at which time he saw a neurologist but never received a full workup as it did not become a lingering problem. In addition to the patient's current dizziness, the patient reports a transient loss of vision yesterday that lasted for a few seconds, described as "looking through a kaleidoscope." This morning, he reports that around 8:00 am he lost complete vision in his right eye, only able to see light and blurry shapes out of the lateral corner of his eye. Daughter at bedside reports that he has ahd surgery for glaucoma in the past. Patient denies any past cardiac history. Patient denies any fevers, " chills, headache, eye pain, CP, SOB, abdominal pain, dysuria, weakness or numbness in his extremities. Per family at bedside, patient at baseline is a very active, independent man who up until Saturday was in his usual state of health.    Upon arrival to the ED, patient was found to be afebrile, bradycardic with HR (40-50's), and hypertensive with SBP > 170. Initially evaluated with a Head CT that was negative for CVA. No LVO on CTA-MP. Vascular Neuro believed pt's acute loss of vision was moire likely an ocular pathology, but would follow up CT with an MRI for further re-assurance. Ophthalmology consulted and concerned for central retinal artery occlusion. MRI Brain pending. Initial lab work was unremarkable for any electrolyte abnormalities and TSH was wnl. On exam, patient remains bradycardic with HR in low 40's, occasional complaining of dizziness. Per chart review, it appears that patient has been bradycardic with HR in 50-60's since July 2018, never worked up by a cardiologist. Further work-up of pt's symptomatic bradycardia in regards to a cardiac etiology including TTE w/ bubble study, U/S b/l carotids, and EP consult with possible intervention for a pacemaker placement have been ordered.          Past Medical History:   Diagnosis Date    Glaucoma     Hypothyroid        Past Surgical History:   Procedure Laterality Date    CARPAL TUNNEL RELEASE Right     cataract surgery         Review of patient's allergies indicates:  No Known Allergies    Current Facility-Administered Medications on File Prior to Encounter   Medication    [COMPLETED] cloNIDine tablet 0.2 mg     Current Outpatient Medications on File Prior to Encounter   Medication Sig    brimonidine 0.1% (ALPHAGAN P) 0.1 % Drop Place 1 drop into both eyes 3 (three) times daily.    etodolac (LODINE) 300 MG Cap Take 300 mg by mouth 2 (two) times daily as needed.     levothyroxine (SYNTHROID) 50 MCG tablet Take 50 mcg by mouth once daily.    timolol  0.5 % ophthalmic solution 1 drop 2 (two) times daily.    [DISCONTINUED] pyridostigmine (MESTINON) 60 mg Tab Take 1/2 tablet BID for 3 weeks, then may increase to 1 tablet BID if eye symptoms are not resolved.     Family History     None        Tobacco Use    Smoking status: Former Smoker     Packs/day: 1.00     Years: 28.00     Pack years: 28.00     Last attempt to quit: 1978     Years since quittin.8    Smokeless tobacco: Current User     Types: Chew   Substance and Sexual Activity    Alcohol use: No    Drug use: No    Sexual activity: Not on file     Review of Systems   Constitutional: Negative for chills and fever.   HENT: Negative for ear discharge, ear pain and rhinorrhea.    Eyes: Positive for visual disturbance. Negative for photophobia and pain.   Respiratory: Negative for cough and shortness of breath.    Cardiovascular: Negative for chest pain and palpitations.   Gastrointestinal: Negative for abdominal pain, nausea and vomiting.   Genitourinary: Negative for dysuria.   Musculoskeletal: Negative for back pain and myalgias.   Skin: Negative for color change.   Neurological: Positive for dizziness (  vertigo). Negative for syncope, light-headedness and headaches.   Psychiatric/Behavioral: Negative for agitation and confusion.     Objective:     Vital Signs (Most Recent):  Temp: 97.7 °F (36.5 °C) (19 1546)  Pulse: (!) 45 (19 1900)  Resp: 13 (19)  BP: (!) 167/78 (19)  SpO2: 96 % (19) Vital Signs (24h Range):  Temp:  [97.6 °F (36.4 °C)-97.7 °F (36.5 °C)] 97.7 °F (36.5 °C)  Pulse:  [38-62] 45  Resp:  [12-20] 13  SpO2:  [95 %-100 %] 96 %  BP: (116-201)/(57-95) 167/78     Weight: 77.6 kg (171 lb)  Body mass index is 26 kg/m².    Physical Exam   Constitutional: He is oriented to person, place, and time. He appears well-developed and well-nourished.   HENT:   Head: Normocephalic and atraumatic.   Mouth/Throat: Oropharynx is clear and moist.   Eyes: Lids are  normal. Pupils are equal, round, and reactive to light. Right eye exhibits no chemosis. Right conjunctiva is injected. Right eye exhibits abnormal extraocular motion.   Neck: Normal range of motion. Neck supple.   Cardiovascular: Regular rhythm, normal heart sounds and intact distal pulses. Bradycardia present.   Pulmonary/Chest: Effort normal and breath sounds normal.   Abdominal: Soft. Bowel sounds are normal. There is no tenderness.   Musculoskeletal: Normal range of motion.   Neurological: He is alert and oriented to person, place, and time. He has normal strength.   Psychiatric: He has a normal mood and affect.         CRANIAL NERVES     CN III, IV, VI   Pupils are equal, round, and reactive to light.       Significant Labs:   CBC:   Recent Labs   Lab 03/14/19  1118 03/14/19  1500   WBC 5.88 6.17   HGB 14.1 12.9*   HCT 41.4 38.6*    215     CMP:   Recent Labs   Lab 03/14/19  1118 03/14/19  1500    133*   K 4.4 5.1    102   CO2 28 25    107   BUN 14 15   CREATININE 1.3 1.2   CALCIUM 10.1 9.4   PROT 8.0 7.2   ALBUMIN 3.7 3.4*   BILITOT 0.6 0.6   ALKPHOS 106 102   AST 22 27   ALT 19 20   ANIONGAP 7* 6*   EGFRNONAA 50* 55.6*     Magnesium: No results for input(s): MG in the last 48 hours.  Troponin: No results for input(s): TROPONINI in the last 48 hours.  TSH:   Recent Labs   Lab 03/14/19  1500   TSH 0.804       Significant Imaging: I have reviewed all pertinent imaging results/findings within the past 24 hours.     Imaging Results          MRI Brain Without Contrast (Final result)  Result time 03/14/19 19:16:29    Final result by Amarilis Ochoa MD (03/14/19 19:16:29)                 Impression:      Possible subcortical punctate acute infarct versus microvascular ischemic change in the left temporoparietal region.    Senescent changes and moderate chronic microvascular ischemic disease.    Electronically signed by resident: Law  Bartolo  Date:    03/14/2019  Time:    18:40    Electronically signed by: Amarilis Ochoa MD  Date:    03/14/2019  Time:    19:16             Narrative:    EXAMINATION:  MRI BRAIN WITHOUT CONTRAST    CLINICAL HISTORY:  Stroke;    TECHNIQUE:  Multiplanar multisequence MR imaging of the brain was performed without the use of intravenous contrast.    COMPARISON:  CTA head 03/14/2019    FINDINGS:  There is mild generalized cerebral volume loss and mild compensatory expansion of the lateral ventricles.  No evidence of hydrocephalus.    In the left subcortical temporoparietal region there is a punctate focus of diffusion restriction with corresponding hyperintense T2 FLAIR signal although no definite low ADC signal to definitely suggest acute infarct located.  There is patchy and confluent T2 FLAIR signal hyperintensity throughout the supratentorial periventricular and subcortical white matter. No gradient signal abnormality to suggest hemorrhage.  No extra-axial fluid collections.  Major intracranial T2 flow voids are preserved.  Bone marrow signal intensity is unremarkable.                               X-Ray Chest AP Portable (Final result)  Result time 03/14/19 16:08:58    Final result by Johnny Hardin MD (03/14/19 16:08:58)                 Impression:      No radiographic acute intrathoracic process seen.      Electronically signed by: Johnny Hardin MD  Date:    03/14/2019  Time:    16:08             Narrative:    EXAMINATION:  XR CHEST AP PORTABLE    CLINICAL HISTORY:  Stroke;    TECHNIQUE:  Single frontal view of the chest was performed.    COMPARISON:  CT thorax 06/26/2018    FINDINGS:  Monitoring leads overlie the chest.  Cardiomediastinal silhouette is midline and within normal limits.  Chronic mild nonspecific elevation of the right hemidiaphragm.  Few scattered linear opacities consistent with subsegmental scarring versus atelectasis.  The lungs are otherwise well expanded without focal consolidation, pleural  effusion or pneumothorax.  No acute osseous process seen.  PA and lateral views can be obtained.                               CTA STROKE MULTI-PHASE (Final result)     Abnormal  Result time 03/14/19 15:53:28    Final result by Darshan Tliley MD (03/14/19 15:53:28)                 Impression:      No evidence of high-grade stenosis or major vessel occlusion.    Moderate supratentorial findings of chronic microvascular ischemic disease.    3 mm micronodule in the upper pole of right lung.  For a solid nodule <6 mm, Fleischner Society 2017 guidelines recommend no routine follow up for a low risk patient, or follow-up with non-contrast chest CT at 12 months in a high risk patient.    Multilevel facet degeneration is present throughout the cervical spine with multilevel predominantly left-sided foraminal stenoses.    This report was flagged in Epic as containing an incidental finding.    Electronically signed by resident: Kelly Woodruff  Date:    03/14/2019  Time:    15:23    Electronically signed by: Darshan Tilley MD  Date:    03/14/2019  Time:    15:53             Narrative:    EXAMINATION:  CTA STROKE MULTI-PHASE    CLINICAL HISTORY:  Stroke    TECHNIQUE:  Non contrast low dose axial images were obtained thought the head. CT angiogram was performed from the level of the joshua to the top of the head following the IV administration of 100mL of Omnipaque 350.   Sagittal and coronal reconstructions and maximum intensity projection reconstructions were performed. Arterial stenosis percentages are based on NASCET measurement criteria.  Two additional phases of immediate post-contrast CTA images were performed through the head alone.    COMPARISON:  CT head 03/14/2019.  CT chest 06/26/2018    FINDINGS:  Intracranial compartment: Prominence of the ventricles and sulci compatible with mild generalized cerebral volume loss.  No hydrocephalus. No extra-axial blood or fluid collections. Moderate degree of patchy and confluent  hypoattenuation in the supratentorial periventricular and subcortical white matter most likely relates to sequelae of chronic microvascular ischemic disease.  No parenchymal mass, hemorrhage, edema, or major vascular distribution infarct.    Skull/Extracranial Contents (limited evaluation): No fracture. Mastoid air cells and paranasal sinuses are essentially clear.    Non-Vascular Structures of the Neck/Thoracic Inlet (limited evaluation): 3 mm micronodule in the upper lobe of the right lung is unchanged.  Multilevel facet degeneration is present throughout the cervical spine with multilevel predominantly left-sided foraminal stenoses.    Aorta: Normal 3 vessel arch.    Extracranial carotid circulation: Minimal atherosclerotic calcification at the carotid bulbs.  No hemodynamically significant stenosis, aneurysmal dilatation, or dissection.    Extracranial vertebral circulation: No hemodynamically significant stenosis, aneurysmal dilatation, or dissection.    Intracranial Arteries: Few air foci in the cavernous sinus are likely related to IV injection.  No focal high-grade stenosis, occlusion, or aneurysm.    Venous structures (limited evaluation): Unremarkable.                                    Assessment/Plan:     * Bradycardia    - EKG: sinus bradycardia, HR 50. No prior EKGs to compare  - symptomatic w/dizziness. Noted to have bradycardia dating back to 6/2018 with HR 50s  - r/o infectious etiologies: CXR, UA  - EP consult to evaluate for PPM placement, appreciate assistance       Central retinal artery occlusion of right eye    Management per ophthalmology:    -amaurosis yesterday followed by sudden onset vision loss today  -exam consistent w/ CRAO  -APD OD  -recommend MRI head, b/l carotid U/s, echo w/ bubble  -ESR/CRP to evaluate for possible GCA despite no pain  -no scalp tenderness  -hx HLD (had to stop statin d/t intolerance)  -lipid panel ordered      Will schedule patient for f/u w/ retina in [place] in  "1-2 weeks             Debility    - PT/OT eval and treat       Glaucoma    - home meds: brimodinine and timolol eye drops  - opthalmology consulted, see above       Hypothyroidism    - TSH 0.804 wnl  - Cont synthroid 50 mcg daily       Visual loss, right eye    - 82 y/o M with acute-onset R monocular vision loss, associated with several day Hx of ataxia. Transferred from [place] via telestroke for vascular neurology evaluation. Out of the window for tPA. CTH negative. No LVO on CTA-MP.  - Vascular neurology consulted in the ED: Not very consistent with a vascular territory, but should consider rare situation of embolic infarcts to posterior circulation and retinal artery. More likely ocular pathology.  --  MRI Brain WO -> Possible subcortical punctate acute infarct versus microvascular ischemic change in the left temporoparietal region. Per Vascular Neuro, most likely an incidental finding and not likely cause of patient's current symptoms  -- ophthalmology consulted for investigating ocular pathologies, appreciate assistance  -- Antithrombotics for secondary stroke prevention: Antiplatelets: Recommend Aspirin: 81 mg daily, holding until evaluated by EP for possible pacemaker.   -- Patient passed bedside swallow study, will allow patient to eat but will make him NPO at midnight pending intervention by EP team  -- Statins: Atorvastatin- 40 mg daily  -- TSH wnl, TTE w/ bubble study to assess cardiac function/status, ordered  -- VTE prophylaxis: Heparin 5000 units SQ every 8 hours, pending EP recs  -- BP parameters:  SBP <220     Myasthenia gravis    6/2018:  AChR Binding Ab, Serum 1.9  Striated Muscle Ab Positive 1:[id number]  Acetylchol Modul Ab 98%    - Episode of "blurred vision and unilateral facial droop" one year ago. Negative for stroke, however Myasthenia w/u positive.  - Patient started on Mestinon, but took himself off of it due to drug-induced HTN  - Unlikely that current symptoms are related to MG per vasc " neurology           VTE Risk Mitigation (From admission, onward)        Ordered     IP VTE HIGH RISK PATIENT  Once      03/14/19 1824     Place sequential compression device  Until discontinued      03/14/19 0287             Shar Alcaraz MD  Department of Hospital Medicine   [facility]

## 2019-03-15 NOTE — ED NOTES
Assumed care of patient at this time. Patient resting quietly in bed, no apparent distress noted, resp w/ ease, vitals stable. Patient still unable to see out of R eye, reports no visual changes in L eye. Patient and patient family updated with wait time. No additional questions or concerns at this time.     LOC: The patient is awake, alert, and aware of environment. The patient is oriented x 3 and speaking appropriately.   APPEARANCE: No acute distress noted.   PSYCHOSOCIAL: Patient is calm and cooperative.   SKIN: The skin is warm, dry.   RESPIRATORY: Airway is open and patent. Bilateral chest rise and fall. Respirations are spontaneous, even and unlabored. Normal effort and rate noted. No accessory muscle use noted.   CARDIAC: Patient is bradycardic. Denies chest pain or SOB.   ABDOMEN: Soft and non tender to palpation. No distention noted.   URINARY:  Voids independently.   EXTREMITIES: No swelling noted.   NEUROLOGIC: Eyes open spontaneously. Speech clear. Tolerating saliva secretions well. Able to follow commands, demonstrating ability to actively and appropriately communicate within context of current conversation. Symmetrical facial muscles. Moving all extremities well. Movement is purposeful.   MUSCULOSKELETAL: No obvious deformities noted.

## 2019-03-15 NOTE — PT/OT/SLP DISCHARGE
Occupational Therapy Discharge Summary    Sekou Coker  MRN: [id number]   Principal Problem: Bradycardia      Patient Discharged from acute Occupational Therapy on 3/15.  Please refer to prior OT note dated 3/15 for functional status.    Assessment:      Patient appropriate for care in another setting.    Objective:     GOALS:   Multidisciplinary Problems     Occupational Therapy Goals        Problem: Occupational Therapy Goal    Goal Priority Disciplines Outcome Interventions   Occupational Therapy Goal     OT, PT/OT                     Reasons for Discontinuation of Therapy Services  Transfer to alternate level of care.      Plan:     Patient Discharged to: Home with Home Health Service    ALEX Jacobo  3/15/2019

## 2019-03-15 NOTE — SUBJECTIVE & OBJECTIVE
Interval History: No acute events overnight. Patient remained bradycardic with HR 40-50's and intermittently hypertensive throughout the night. Patient reports feeling fine this morning, not complaining of any dizziness or pain. Patient ambulated well with assistance with nursing staff and physical therapy with increasing HR and no complaint of symptoms. TTE w/ bubble study pending.     Review of Systems   Constitutional: Negative for chills and fever.   HENT: Negative for ear discharge, ear pain and rhinorrhea.    Eyes: Positive for visual disturbance. Negative for photophobia and pain.   Respiratory: Negative for cough and shortness of breath.    Cardiovascular: Negative for chest pain and palpitations.   Gastrointestinal: Negative for abdominal pain, nausea and vomiting.   Genitourinary: Positive for testicular pain. Negative for dysuria.   Musculoskeletal: Negative for back pain and myalgias.   Skin: Negative for color change.   Neurological: Positive for dizziness (  vertigo). Negative for syncope, light-headedness and headaches.   Psychiatric/Behavioral: Negative for agitation and confusion.     Objective:     Vital Signs (Most Recent):  Temp: 98.5 °F (36.9 °C) (03/15/19 1030)  Pulse: (!) 53 (03/15/19 1030)  Resp: 18 (03/15/19 1030)  BP: (!) 163/72 (03/15/19 1030)  SpO2: 97 % (03/15/19 0704) Vital Signs (24h Range):  Temp:  [97.7 °F (36.5 °C)-98.5 °F (36.9 °C)] 98.5 °F (36.9 °C)  Pulse:  [38-54] 53  Resp:  [10-23] 18  SpO2:  [92 %-100 %] 97 %  BP: ()/(52-82) 163/72     Weight: 77.6 kg (171 lb)  Body mass index is 26 kg/m².    Intake/Output Summary (Last 24 hours) at 3/15/2019 1513  Last data filed at 3/15/2019 0754  Gross per 24 hour   Intake --   Output 275 ml   Net -275 ml      Physical Exam   Constitutional: He is oriented to person, place, and time. He appears well-developed and well-nourished.   HENT:   Head: Normocephalic and atraumatic.   Mouth/Throat: Oropharynx is clear and moist.   Eyes: Lids  are normal. Pupils are equal, round, and reactive to light. Right eye exhibits no chemosis. Right conjunctiva is injected. Right eye exhibits abnormal extraocular motion.   Neck: Normal range of motion. Neck supple.   Cardiovascular: Regular rhythm, normal heart sounds and intact distal pulses. Bradycardia present.   Pulmonary/Chest: Effort normal and breath sounds normal.   Abdominal: Soft. Bowel sounds are normal. There is no tenderness.   Musculoskeletal: Normal range of motion.   Neurological: He is alert and oriented to person, place, and time. He has normal strength.   Psychiatric: He has a normal mood and affect.       Significant Labs:   CBC:   Recent Labs   Lab 03/14/19  1118 03/14/19  1500 03/15/19  0311   WBC 5.88 6.17 7.59   HGB 14.1 12.9* 13.0*   HCT 41.4 38.6* 39.0*    215 216     CMP:   Recent Labs   Lab 03/14/19  1118 03/14/19  1500 03/15/19  0311    133* 139   K 4.4 5.1 4.5    102 104   CO2 28 25 29    107 110   BUN 14 15 18   CREATININE 1.3 1.2 1.3   CALCIUM 10.1 9.4 9.6   PROT 8.0 7.2 7.2   ALBUMIN 3.7 3.4* 3.3*   BILITOT 0.6 0.6 0.5   ALKPHOS 106 102 101   AST 22 27 19   ALT 19 20 18   ANIONGAP 7* 6* 6*   EGFRNONAA 50* 55.6* 50.5*     Lipid Panel:   Recent Labs   Lab 03/14/19  1500   CHOL 187   HDL 31*   LDLCALC 139.8   TRIG 81   CHOLHDL 16.6*     Magnesium:   Recent Labs   Lab 03/15/19  0311   MG 2.2     Troponin: No results for input(s): TROPONINI in the last 48 hours.  TSH:   Recent Labs   Lab 03/14/19  1500   TSH 0.804       Significant Imaging: I have reviewed all pertinent imaging results/findings within the past 24 hours.     Imaging Results          US Carotid Bilateral (Final result)  Result time 03/15/19 10:00:07    Final result by Nithin Stone MD (03/15/19 10:00:07)                 Impression:      1 - 39% stenosis of the right internal carotid artery.    1 - 39% stenosis of the left internal carotid artery.    Mild homogeneous bilateral atherosclerosis  at the carotid bifurcations    Electronically signed by resident: Nithin De Leon  Date:    03/15/2019  Time:    09:28    Electronically signed by: Nithin Stone MD  Date:    03/15/2019  Time:    10:00             Narrative:    EXAMINATION:  US CAROTID BILATERAL    CLINICAL HISTORY:  Cerebral infarction, unspecified    TECHNIQUE:  Grayscale and color spectral Doppler ultrasound imaging of the carotid and vertebral artery systems bilaterally.    COMPARISON:  CTA stroke multiphase 03/14/2019    FINDINGS:  Right:    Internal Carotid Artery (ICA) peak systolic velocity 116 cm/sec    Internal Carotid Artery (ICA) end-diastolic velocity 28 cm/sec    Common Carotid Artery (CCA) peak systolic velocity 104 cm/sec    Common Carotid Artery (CCA) end-diastolic velocity 19 cm/sec    ICA/CCA peak systolic velocity ratio: 1.12    ICA/CCA end-diastolic velocity ratio: 1.47    Plaque formation: Homogeneous    Vertebral artery: Antegrade flow and normal waveform.    Left:    Internal Carotid Artery (ICA)  peak systolic velocity 103 cm/sec    Internal Carotid Artery (ICA) end-diastolic velocity 27 cm/sec    Common Carotid Artery (CCA) peak systolic velocity 114 cm/sec    Common Carotid Artery (CCA) end-diastolic velocity 21 cm/sec    ICA/CCA peak systolic velocity ratio: 0.90    ICA/CCA end diastolic velocity ratio: 1.29    Plaque formation: Homogeneous    Vertebral artery: Antegrade flow and normal waveform.                               MRI Brain Without Contrast (Final result)  Result time 03/14/19 19:16:29    Final result by Amarilis Ochoa MD (03/14/19 19:16:29)                 Impression:      Possible subcortical punctate acute infarct versus microvascular ischemic change in the left temporoparietal region.    Senescent changes and moderate chronic microvascular ischemic disease.    Electronically signed by resident: Law Mcfarland  Date:    03/14/2019  Time:    18:40    Electronically signed by: Amarilis Ochoa  MD  Date:    03/14/2019  Time:    19:16             Narrative:    EXAMINATION:  MRI BRAIN WITHOUT CONTRAST    CLINICAL HISTORY:  Stroke;    TECHNIQUE:  Multiplanar multisequence MR imaging of the brain was performed without the use of intravenous contrast.    COMPARISON:  CTA head 03/14/2019    FINDINGS:  There is mild generalized cerebral volume loss and mild compensatory expansion of the lateral ventricles.  No evidence of hydrocephalus.    In the left subcortical temporoparietal region there is a punctate focus of diffusion restriction with corresponding hyperintense T2 FLAIR signal although no definite low ADC signal to definitely suggest acute infarct located.  There is patchy and confluent T2 FLAIR signal hyperintensity throughout the supratentorial periventricular and subcortical white matter. No gradient signal abnormality to suggest hemorrhage.  No extra-axial fluid collections.  Major intracranial T2 flow voids are preserved.  Bone marrow signal intensity is unremarkable.                               X-Ray Chest AP Portable (Final result)  Result time 03/14/19 16:08:58    Final result by Johnny Hadrin MD (03/14/19 16:08:58)                 Impression:      No radiographic acute intrathoracic process seen.      Electronically signed by: Johnny Hardin MD  Date:    03/14/2019  Time:    16:08             Narrative:    EXAMINATION:  XR CHEST AP PORTABLE    CLINICAL HISTORY:  Stroke;    TECHNIQUE:  Single frontal view of the chest was performed.    COMPARISON:  CT thorax 06/26/2018    FINDINGS:  Monitoring leads overlie the chest.  Cardiomediastinal silhouette is midline and within normal limits.  Chronic mild nonspecific elevation of the right hemidiaphragm.  Few scattered linear opacities consistent with subsegmental scarring versus atelectasis.  The lungs are otherwise well expanded without focal consolidation, pleural effusion or pneumothorax.  No acute osseous process seen.  PA and lateral views can be  obtained.                               CTA STROKE MULTI-PHASE (Final result)     Abnormal  Result time 03/14/19 15:53:28    Final result by Darshan Tilley MD (03/14/19 15:53:28)                 Impression:      No evidence of high-grade stenosis or major vessel occlusion.    Moderate supratentorial findings of chronic microvascular ischemic disease.    3 mm micronodule in the upper pole of right lung.  For a solid nodule <6 mm, Fleischner Society 2017 guidelines recommend no routine follow up for a low risk patient, or follow-up with non-contrast chest CT at 12 months in a high risk patient.    Multilevel facet degeneration is present throughout the cervical spine with multilevel predominantly left-sided foraminal stenoses.    This report was flagged in Epic as containing an incidental finding.    Electronically signed by resident: Kelly Woodruff  Date:    03/14/2019  Time:    15:23    Electronically signed by: Darshan Tilley MD  Date:    03/14/2019  Time:    15:53             Narrative:    EXAMINATION:  CTA STROKE MULTI-PHASE    CLINICAL HISTORY:  Stroke    TECHNIQUE:  Non contrast low dose axial images were obtained thought the head. CT angiogram was performed from the level of the joshua to the top of the head following the IV administration of 100mL of Omnipaque 350.   Sagittal and coronal reconstructions and maximum intensity projection reconstructions were performed. Arterial stenosis percentages are based on NASCET measurement criteria.  Two additional phases of immediate post-contrast CTA images were performed through the head alone.    COMPARISON:  CT head 03/14/2019.  CT chest 06/26/2018    FINDINGS:  Intracranial compartment: Prominence of the ventricles and sulci compatible with mild generalized cerebral volume loss.  No hydrocephalus. No extra-axial blood or fluid collections. Moderate degree of patchy and confluent hypoattenuation in the supratentorial periventricular and subcortical white matter most  likely relates to sequelae of chronic microvascular ischemic disease.  No parenchymal mass, hemorrhage, edema, or major vascular distribution infarct.    Skull/Extracranial Contents (limited evaluation): No fracture. Mastoid air cells and paranasal sinuses are essentially clear.    Non-Vascular Structures of the Neck/Thoracic Inlet (limited evaluation): 3 mm micronodule in the upper lobe of the right lung is unchanged.  Multilevel facet degeneration is present throughout the cervical spine with multilevel predominantly left-sided foraminal stenoses.    Aorta: Normal 3 vessel arch.    Extracranial carotid circulation: Minimal atherosclerotic calcification at the carotid bulbs.  No hemodynamically significant stenosis, aneurysmal dilatation, or dissection.    Extracranial vertebral circulation: No hemodynamically significant stenosis, aneurysmal dilatation, or dissection.    Intracranial Arteries: Few air foci in the cavernous sinus are likely related to IV injection.  No focal high-grade stenosis, occlusion, or aneurysm.    Venous structures (limited evaluation): Unremarkable.

## 2019-03-15 NOTE — PT/OT/SLP EVAL
Physical Therapy Evaluation    Patient Name:  Sekou Coker   MRN:  [id number]    Recommendations:     Discharge Recommendations:  home with home health   Discharge Equipment Recommendations: none   Barriers to discharge: None    Assessment:     Sekou Coker is a 83 y.o. male admitted with a medical diagnosis of Bradycardia.  He presents with the following impairments/functional limitations:  weakness, impaired endurance, gait instability, impaired functional mobilty, impaired balance, impaired self care skills, decreased safety awareness. Pt performed bed mobility and transfers CGA. Pt amb ~20ft to and from bathroom CGA without AD, slightly unsteady; no LOB or SOB. Pt will benefit from skilled PT to improve deficits and increase overall functional mobility.     Rehab Prognosis: Good; patient would benefit from acute skilled PT services to address these deficits and reach maximum level of function.    Recent Surgery: * No surgery found *      Plan:     During this hospitalization, patient to be seen 3 x/week to address the identified rehab impairments via gait training, therapeutic activities, therapeutic exercises, neuromuscular re-education and progress toward the following goals:    · Plan of Care Expires:  04/15/19    Subjective     Chief Complaint: NA  Patient/Family Comments/goals: return home  Pain/Comfort:  · Pain Rating 1: 0/10  · Pain Rating Post-Intervention 1: 0/10    Patients cultural, spiritual, Mandaen conflicts given the current situation:      Living Environment:  Pt lives alone in 1-story house with 4 JOURDAN and R handrails. Pt reports (I) with ADLs and amb. Pt daughter present and reports pt has family assist upon d/c.  Prior to admission, patients level of function was (I).  Equipment used at home: none.  DME owned (not currently used): none.  Upon discharge, patient will have assistance from family.    Objective:     Communicated with RN prior to session.  Patient found on stretcher in  hallway and daughter present telemetry  upon PT entry to room.    General Precautions: Standard, aspiration, fall   Orthopedic Precautions:N/A   Braces: N/A     Exams:  · Cognitive Exam:  Patient is oriented to Person, Place, Time and Situation  · Gross Motor Coordination:  WFL  · Postural Exam:  Patient presented with the following abnormalities:    · -       Rounded shoulders  · Sensation:    · -       Intact  light/touch  BLE  · Skin Integrity/Edema:      · -       Skin integrity: Visible skin intact  · RLE ROM: WFL  · RLE Strength: WFL  · LLE ROM: WFL  · LLE Strength: WFL    Functional Mobility:  Bed Mobility:     · Supine to Sit: contact guard assistance  · Sit to Supine: contact guard assistance    Transfers:     · Sit to Stand:  contact guard assistance with no AD    Gait: ~20ft to and from bathroom CGA without AD, slightly unsteady; no LOB or SOB    Therapeutic Activities and Exercises:  Pt sat EOB with S.  Pt stood at toilet and sink for ~4min with CGA.  Pt and daughter educated on:  -role of PT/POC  -safety with mobility  -importance of OOB activity  Pt safe to amb with RN staff.     AM-PAC 6 CLICK MOBILITY  Total Score:19     Patient left HOB elevated with all lines intact, RN notified and daughter present.    GOALS:   Multidisciplinary Problems     Physical Therapy Goals        Problem: Physical Therapy Goal    Goal Priority Disciplines Outcome Goal Variances Interventions   Physical Therapy Goal     PT, PT/OT Ongoing (interventions implemented as appropriate)     Description:  Goals to be met by: 2019     Patient will increase functional independence with mobility by performin. Supine to sit with Modified [place]  2. Sit to supine with Modified [place]  3. Sit to stand transfer with Supervision  4. Gait  x 200 feet with Supervision with or without appropriate AD.   5. Ascend/descend 4 stair with right Handrails Supervision.   6. Lower extremity exercise program x15 reps per  handout, with supervision                      History:     Past Medical History:   Diagnosis Date    Glaucoma     Hypothyroid        Past Surgical History:   Procedure Laterality Date    CARPAL TUNNEL RELEASE Right     cataract surgery         Time Tracking:     PT Received On: 03/15/19  PT Start Time: 1220     PT Stop Time: 1246  PT Total Time (min): 26 min     Billable Minutes: Evaluation 16 and Therapeutic Activity 10      MARCELO SCOTT, PT  03/15/2019

## 2019-03-15 NOTE — ASSESSMENT & PLAN NOTE
Management per ophthalmology:    -amaurosis yesterday followed by sudden onset vision loss today  -exam consistent w/ CRAO  -APD OD  -recommend MRI head, b/l carotid U/s, echo w/ bubble  -ESR/CRP to evaluate for possible GCA despite no pain  -no scalp tenderness  -hx HLD (had to stop statin d/t intolerance)  -lipid panel ordered      Will schedule patient for f/u w/ retina in [place] in 1-2 weeks

## 2019-03-15 NOTE — PROGRESS NOTES
Initially consulted under concerns for symptomatic bradycardia. The patient explained that he has always had a HR on the 50s that occasionally increases to the 70s. His vertigo started just recently and he says is worse when he lies down and does not have lightheadedness or chest pain when he walks. He had a similar episode of vertigo 40 years ago. Whilst lying down, his vertigo worsens when he turns his head to the sides. The nurse walked the patient and the HR increased from 50s to 70s without symptoms. We do not think he has symptomatic bradycardia.

## 2019-03-15 NOTE — PLAN OF CARE
Problem: Physical Therapy Goal  Goal: Physical Therapy Goal  Goals to be met by: 2019     Patient will increase functional independence with mobility by performin. Supine to sit with Modified [place]  2. Sit to supine with Modified [place]  3. Sit to stand transfer with Supervision  4. Gait  x 200 feet with Supervision with or without appropriate AD.   5. Ascend/descend 4 stair with right Handrails Supervision.   6. Lower extremity exercise program x15 reps per handout, with supervision    Outcome: Ongoing (interventions implemented as appropriate)  Pt evaluation complete. Pt goals set.    MARCELO SCOTT, PT  3/15/2019

## 2019-03-15 NOTE — PT/OT/SLP EVAL
"Occupational Therapy   Evaluation    Name: Sekou Coker  MRN: [id number]  Admitting Diagnosis:  Bradycardia      Recommendations:     Discharge Recommendations: home health OT  Discharge Equipment Recommendations:  none  Barriers to discharge:  None    Assessment:     Sekou Coker is a 83 y.o. male with a medical diagnosis of Bradycardia.  He presents with performance deficits affecting function: weakness, impaired sensation, impaired endurance, impaired self care skills, impaired functional mobilty, gait instability, impaired balance, impaired cognition, decreased coordination, decreased safety awareness, decreased upper extremity function, decreased ROM, impaired coordination, impaired fine motor.      Rehab Prognosis: Good; patient would benefit from acute skilled OT services to address these deficits and reach maximum level of function.       Plan:     Patient to be seen 3 x/week to address the above listed problems via self-care/home management, therapeutic activities, therapeutic exercises, neuromuscular re-education, sensory integration  · Plan of Care Expires: 04/12/19  · Plan of Care Reviewed with:  patient and dtg     Subjective     Patient:  "Saturday I went to lay down and the bed started spinning.  I sat in the chair to sleep the next few days.  I tried to lay down again 3-4 days later and it started spinning again.  On Wednesday, my bloop pressure was running high on and off.  Wednesday afternoon, I was watching TV and my right eye was like looking thru a kaliedoscope for about 5 seconds.  Then it cleared up and everything was normal.  I went to urgent care and then to the ER.  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  It stays numb."    Occupational Profile:  Patient resides in [place] alone in " one story home with 4 steps to enter, rail on the right.  Patient is right handed.  PTA patient independent with ADLs including driving.  Retired: oil field.  Hobbies:  Working on small appliances.  Dtg assist with meal preparation.    Pain/Comfort:  · Pain Rating 1: 0/10  · Pain Rating Post-Intervention 1: 0/10    Patients cultural, spiritual, Adventist conflicts given the current situation: no    Objective:     Communicated with: Nurse prior to session.  Patient found supine with blood pressure cuff, peripheral IV, telemetry, pulse ox (continuous) upon OT entry to room.  Family present.    General Precautions: Standard, aspiration, fall   Orthopedic Precautions:N/A   Braces: N/A     Occupational Performance:    Bed Mobility:    · Patient completed Rolling/Turning to Left with  modified independence  · Patient completed Rolling/Turning to Right with modified independence  · Patient completed Scooting/Bridging with modified independence  · Patient completed Supine to Sit with modified independence  · Patient completed Sit to Supine with modified independence    Functional Mobility/Transfers:  · Patient completed Sit <> Stand Transfer with supervision  with  no assistive device   · Patient completed Bed <> Chair Transfer using Stand Pivot technique with supervision with no assistive device    Activities of Daily Living:  · Grooming: supervision while standing  · Upper Body Dressing: supervision while seated EOB  · Lower Body Dressing: supervision while seated EOB     Cognitive/Visual Perceptual:  Cognitive/Psychosocial Skills:     -       Oriented to: Person, Place, Time and Situation   -       Follows Commands/attention:Follows one-step commands  -       Communication: clear/fluent  -       Memory: No Deficits noted  -       Safety awareness/insight to disability: intact   -       Mood/Affect/Coping skills/emotional control: Appropriate to situation and Cooperative    Physical Exam:  Postural examination/scapula  alignment:    -       Rounded shoulders  Skin integrity: Visible skin intact  Edema:  None noted  Sensation:    -       Impaired: right hand numbness (Chronic)  Upper Extremity Range of Motion:     -       Right Upper Extremity: WNL  -       Left Upper Extremity: WNL  Upper Extremity Strength:    -       Right Upper Extremity: WNL  -       Left Upper Extremity: WNL    AMPAC 6 Click ADL:  AMPAC Total Score: 18    Treatment & Education:  Patient/ Family education provided for stroke warning signs, prevention guidelines and personal risk factors.  Patient/ Family verbalizing understanding via teach back method.   Patient education provided on role of OT and need for HH OT upon discharge.  Continued education, patient/ family training recommended.  Patient alert and oriented x 3; able to follow 4/4 one step commands.  Patient attentive and interactive throughout the session.  Patient able to identify 5/5 body parts.  Able to name 5/5 objects. Able to identify 20/20 items on visual scanning task.  Patient's functional status and disposition recommendation discussed with stroke team in daily rounds.  White board updated in patient's room.  OT asked if there were any other questions; patient/ family had no further questions.   Education:    Patient left supine with all lines intact and call button in reach    GOALS:   Multidisciplinary Problems     Occupational Therapy Goals        Problem: Occupational Therapy Goal    Goal Priority Disciplines Outcome Interventions   Occupational Therapy Goal     OT, PT/OT                     History:     Past Medical History:   Diagnosis Date    Glaucoma     Hypothyroid        Past Surgical History:   Procedure Laterality Date    CARPAL TUNNEL RELEASE Right     cataract surgery         Time Tracking:     OT Date of Treatment: 03/15/19  OT Start Time: 0600  OT Stop Time: 0622  OT Total Time (min): 22 min    Billable Minutes:Evaluation 12  Cognitive Retraining 10    Jackeline Navarro  LOTR  3/15/2019

## 2019-03-15 NOTE — ED NOTES
Patient ambulated at MD request. Resting HR lying in bed 54/min. Upon sitting position, HR increased to 60/min. Standing increased to 68/min. Patient denies any dizziness or change in LOC. Denies pain. Ambulatory to bathroom with Phys Therapy. With ambulation HR maintain 70-72. Denies change in pain level or dizziness. Ambulated 30 feet without increase in breathing effort. Returned to [facility] bed via W/C. Family at bedside.

## 2019-03-15 NOTE — SUBJECTIVE & OBJECTIVE
Neurologic Chief Complaint: visual loss     Subjective:     Interval History: Patient is seen for follow-up neurological assessment and treatment recommendations:admitted to hospital medicine for symptomatic bradycardia. EP consulted but do not believe this is the case.  He has a history of bradycardia and vertigo.  Found to have acute stroke on MRI brain.      HPI, Past Medical, Family, and Social History remains the same as documented in the initial encounter.     Review of Systems   Constitutional: Negative for chills and fever.   Eyes: Positive for visual disturbance. Negative for photophobia.   Respiratory: Negative for cough and shortness of breath.    Musculoskeletal: Positive for arthralgias and gait problem.   Psychiatric/Behavioral: Negative for agitation and confusion.     Scheduled Meds:   levothyroxine  50 mcg Oral Before breakfast     Continuous Infusions:  PRN Meds:dextrose 50%, dextrose 50%, glucagon (human recombinant), glucose, glucose, sodium chloride 0.9%, sodium chloride 0.9%    Objective:     Vital Signs (Most Recent):  Temp: 98.5 °F (36.9 °C) (03/15/19 1030)  Pulse: (!) 53 (03/15/19 1030)  Resp: 18 (03/15/19 1030)  BP: (!) 163/72 (03/15/19 1030)  SpO2: 97 % (03/15/19 0704)  BP Location: Right arm    Vital Signs Range (Last 24H):  Temp:  [98.1 °F (36.7 °C)-98.5 °F (36.9 °C)]   Pulse:  [38-54]   Resp:  [10-23]   BP: ()/(52-82)   SpO2:  [92 %-100 %]   BP Location: Right arm    Physical Exam   Constitutional: He appears well-developed and well-nourished.   HENT:   Head: Normocephalic and atraumatic.   Cardiovascular: Normal rate and regular rhythm.   Pulmonary/Chest: Effort normal. No respiratory distress.       Neurological Exam:   LOC: alert  Attention Span: Good   Language: No aphasia  Articulation: No dysarthria  Orientation: Person, Place, Time   Visual Fields: complete right visual loss   EOM (CN III, IV, VI): Full/intact  Pupils (CN II, III):left reactive, right minimally reactive    Facial Sensation (CN V): Normal  Facial Movement (CN VII): Symmetric facial expression    Motor: Arm left  Normal 5/5  Leg left  Normal 5/5  Arm right  Normal 5/5  Leg right Normal 5/5  Cebellar: No evidence of appendicular or axial ataxia  Sensation: Intact to light touch, temperature and vibration  Tone: Normal tone throughout    Laboratory:  CMP:   Recent Labs   Lab 03/15/19  0311   CALCIUM 9.6   ALBUMIN 3.3*   PROT 7.2      K 4.5   CO2 29      BUN 18   CREATININE 1.3   ALKPHOS 101   ALT 18   AST 19   BILITOT 0.5     CBC:   Recent Labs   Lab 03/15/19  0311   WBC 7.59   RBC 4.30*   HGB 13.0*   HCT 39.0*      MCV 91   MCH 30.2   MCHC 33.3     Lipid Panel:   Recent Labs   Lab 03/14/19  1500   CHOL 187   LDLCALC 139.8   HDL 31*   TRIG 81     Hgb A1C: No results for input(s): HGBA1C in the last 168 hours.  TSH:   Recent Labs   Lab 03/14/19  1500   TSH 0.804       Diagnostic Results     Brain Imaging   MRI brain 03/14/19  Punctate infarct in the left temporoparietal   Cytotoxic cerebral edema    Vessel Imaging   CTA MP 03/14/19  No high grade stenosis     Cardiac Imaging   Echocardiogram 03/15/19

## 2019-03-15 NOTE — ASSESSMENT & PLAN NOTE
Plan:  - Start amlodipine 5 mg daily  - Take BP log at home for at least 1 week, 2 measurements daily (AM and PM)  - Follow-up with PCP for further medication adjustment

## 2019-03-15 NOTE — ASSESSMENT & PLAN NOTE
- Based on patient's descriptions of symptoms of the room spinning, seems like patient could be experiencing BPPV.  - MRI Brain was negative for any kind of cerebellar infarcts    Plan:  - Patient will follow up with Neurologist back in [facility]

## 2019-03-15 NOTE — PROGRESS NOTES
"[facility]  Vascular Neurology  Comprehensive Stroke Center  Progress Note    Assessment/Plan:     * Central artery occlusion of retina, right    82 y/o M with acute-onset R monocular vision loss, associated with several day Hx of ataxia. Out of the window for tPA.  CTH negative. No LVO on CTA-MP.  Ophthalmology consulted and found to have cherry red spot on in the fovea on fundoscopic examination.  Also punctate stroke seen in left parietal lobe.  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    30 day cardiac event monitor ordered        - Antithrombotics for secondary stroke prevention: Antiplatelets: Aspirin: 81 mg daily and plavix 75mq qd (can d/c plavix after 30 days)  - Statins: Atorvastatin- 40 mg daily  - Aggressive risk factor modification: HTN  - Rehab efforts: PT/OT/SLP to evaluate and treat outpatient OT  - Diagnostics ordered/pending:none  - VTE prophylaxis: Heparin 5000 units SQ every 8 hours  - BP parameters: Infarct: No intervention can normalize     Follow up in stroke clinic within 4-6 weeks, ophthalmology and outpatient OT with driving evaluation before driving if vision would to improve.           Cytotoxic cerebral edema    Area of cytotoxic cerebral edema identified when reviewing brain imaging in the territory of the right middle cerebral artery. There is not mass effect associated with it. We will continue to monitor the patients clinical exam for any worsening of symptoms which may indicate expansion of the stroke or the area of the edema resulting in the clinical change. The pattern is suggestive of embolic etiology         Embolic stroke involving left middle cerebral artery    See CRAO             Myasthenia gravis    Episode of "blurred vision and unilateral facial droop" one year ago.   Negative for stroke, however Myasthenia w/u positive.  Patient started on Mestinon, but took himself off of it due to drug-induced HTN    Unlikely that current symptoms are related to MG      "     No notes on file    STROKE DOCUMENTATION   Acute Stroke Times   Last Known Normal Date: 03/09/19  Symptom Onset Date: 03/14/19  Symptom Onset Time: 0400  Stroke Team Called Date: 03/14/19  Stroke Team Called Time: 1127  Stroke Team Arrival Date: 03/14/19  Stroke Team Arrival Time: 1129  CT Interpretation Time: 1133    NIH Scale:  1a. Level of Consciousness: 0-->Alert, keenly responsive  1b. LOC Questions: 0-->Answers both questions correctly  1c. LOC Commands: 0-->Performs both tasks correctly  2. Best Gaze: 0-->Normal  3. Visual: 2-->Complete hemianopia  4. Facial Palsy: 0-->Normal symmetrical movements  5a. Motor Arm, Left: 0-->No drift, limb holds 90 (or 45) degrees for full 10 secs  5b. Motor Arm, Right: 0-->No drift, limb holds 90 (or 45) degrees for full 10 secs  6a. Motor Leg, Left: 0-->No drift, leg holds 30 degree position for full 5 secs  6b. Motor Leg, Right: 0-->No drift, leg holds 30 degree position for full 5 secs  7. Limb Ataxia: 0-->Absent  8. Sensory: 0-->Normal, no sensory loss  9. Best Language: 0-->No aphasia, normal  10. Dysarthria: 0-->Normal  11. Extinction and Inattention (formerly Neglect): 0-->No abnormality  Total (NIH Stroke Scale): 2       Modified Kayleigh Score: 0  [place] Coma Scale:    ABCD2 Score:    [id number] Score:   HAS -BLED Score:   ICH Score:   Hunt & Dhillon Classification:      Hemorrhagic change of an Ischemic Stroke: Does this patient have an ischemic stroke with hemorrhagic changes? No     Neurologic Chief Complaint: visual loss     Subjective:     Interval History: Patient is seen for follow-up neurological assessment and treatment recommendations:admitted to hospital medicine for symptomatic bradycardia. EP consulted but do not believe this is the case.  He has a history of bradycardia and vertigo.  Found to have acute stroke on MRI brain.      HPI, Past Medical, Family, and Social History remains the same as documented in the initial encounter.     Review of Systems    Constitutional: Negative for chills and fever.   Eyes: Positive for visual disturbance. Negative for photophobia.   Respiratory: Negative for cough and shortness of breath.    Musculoskeletal: Positive for arthralgias and gait problem.   Psychiatric/Behavioral: Negative for agitation and confusion.     Scheduled Meds:   levothyroxine  50 mcg Oral Before breakfast     Continuous Infusions:  PRN Meds:dextrose 50%, dextrose 50%, glucagon (human recombinant), glucose, glucose, sodium chloride 0.9%, sodium chloride 0.9%    Objective:     Vital Signs (Most Recent):  Temp: 98.5 °F (36.9 °C) (03/15/19 1030)  Pulse: (!) 53 (03/15/19 1030)  Resp: 18 (03/15/19 1030)  BP: (!) 163/72 (03/15/19 1030)  SpO2: 97 % (03/15/19 0704)  BP Location: Right arm    Vital Signs Range (Last 24H):  Temp:  [98.1 °F (36.7 °C)-98.5 °F (36.9 °C)]   Pulse:  [38-54]   Resp:  [10-23]   BP: ()/(52-82)   SpO2:  [92 %-100 %]   BP Location: Right arm    Physical Exam   Constitutional: He appears well-developed and well-nourished.   HENT:   Head: Normocephalic and atraumatic.   Cardiovascular: Normal rate and regular rhythm.   Pulmonary/Chest: Effort normal. No respiratory distress.       Neurological Exam:   LOC: alert  Attention Span: Good   Language: No aphasia  Articulation: No dysarthria  Orientation: Person, Place, Time   Visual Fields: complete right visual loss   EOM (CN III, IV, VI): Full/intact  Pupils (CN II, III):left reactive, right minimally reactive   Facial Sensation (CN V): Normal  Facial Movement (CN VII): Symmetric facial expression    Motor: Arm left  Normal 5/5  Leg left  Normal 5/5  Arm right  Normal 5/5  Leg right Normal 5/5  Cebellar: No evidence of appendicular or axial ataxia  Sensation: Intact to light touch, temperature and vibration  Tone: Normal tone throughout    Laboratory:  CMP:   Recent Labs   Lab 03/15/19  0311   CALCIUM 9.6   ALBUMIN 3.3*   PROT 7.2      K 4.5   CO2 29      BUN 18   CREATININE 1.3    ALKPHOS 101   ALT 18   AST 19   BILITOT 0.5     CBC:   Recent Labs   Lab 03/15/19  0311   WBC 7.59   RBC 4.30*   HGB 13.0*   HCT 39.0*      MCV 91   MCH 30.2   MCHC 33.3     Lipid Panel:   Recent Labs   Lab 03/14/19  1500   CHOL 187   LDLCALC 139.8   HDL 31*   TRIG 81     Hgb A1C: No results for input(s): HGBA1C in the last 168 hours.  TSH:   Recent Labs   Lab 03/14/19  1500   TSH 0.804       Diagnostic Results     Brain Imaging   MRI brain 03/14/19  Punctate infarct in the left temporoparietal   Cytotoxic cerebral edema    Vessel Imaging   CTA MP 03/14/19  No high grade stenosis     Cardiac Imaging   Echocardiogram 03/15/19      Enriqueta Pelayo PA-C  Comprehensive Stroke Center  Department of Vascular Neurology   [facility]

## 2019-03-15 NOTE — ED NOTES
Spoke with US who states that patient is in the que and should be going to US around 0900 this morning.

## 2019-03-15 NOTE — ASSESSMENT & PLAN NOTE
Area of cytotoxic cerebral edema identified when reviewing brain imaging in the territory of the right middle cerebral artery. There is not mass effect associated with it. We will continue to monitor the patients clinical exam for any worsening of symptoms which may indicate expansion of the stroke or the area of the edema resulting in the clinical change. The pattern is suggestive of embolic etiology

## 2019-03-15 NOTE — SUBJECTIVE & OBJECTIVE
Past Medical History:   Diagnosis Date    Glaucoma     Hypothyroid        Past Surgical History:   Procedure Laterality Date    CARPAL TUNNEL RELEASE Right     cataract surgery         Review of patient's allergies indicates:  No Known Allergies    Current Facility-Administered Medications on File Prior to Encounter   Medication    [COMPLETED] cloNIDine tablet 0.2 mg     Current Outpatient Medications on File Prior to Encounter   Medication Sig    brimonidine 0.1% (ALPHAGAN P) 0.1 % Drop Place 1 drop into both eyes 3 (three) times daily.    etodolac (LODINE) 300 MG Cap Take 300 mg by mouth 2 (two) times daily as needed.     levothyroxine (SYNTHROID) 50 MCG tablet Take 50 mcg by mouth once daily.    timolol 0.5 % ophthalmic solution 1 drop 2 (two) times daily.    [DISCONTINUED] pyridostigmine (MESTINON) 60 mg Tab Take 1/2 tablet BID for 3 weeks, then may increase to 1 tablet BID if eye symptoms are not resolved.     Family History     None        Tobacco Use    Smoking status: Former Smoker     Packs/day: 1.00     Years: 28.00     Pack years: 28.00     Last attempt to quit: 1978     Years since quittin.8    Smokeless tobacco: Current User     Types: Chew   Substance and Sexual Activity    Alcohol use: No    Drug use: No    Sexual activity: Not on file     Review of Systems   Constitutional: Negative for chills and fever.   HENT: Negative for ear discharge, ear pain and rhinorrhea.    Eyes: Positive for visual disturbance. Negative for photophobia and pain.   Respiratory: Negative for cough and shortness of breath.    Cardiovascular: Negative for chest pain and palpitations.   Gastrointestinal: Negative for abdominal pain, nausea and vomiting.   Genitourinary: Negative for dysuria.   Musculoskeletal: Negative for back pain and myalgias.   Skin: Negative for color change.   Neurological: Positive for dizziness (  vertigo). Negative for syncope, light-headedness and headaches.    Psychiatric/Behavioral: Negative for agitation and confusion.     Objective:     Vital Signs (Most Recent):  Temp: 97.7 °F (36.5 °C) (03/14/19 1546)  Pulse: (!) 45 (03/14/19 1900)  Resp: 13 (03/14/19 1900)  BP: (!) 167/78 (03/14/19 1900)  SpO2: 96 % (03/14/19 1900) Vital Signs (24h Range):  Temp:  [97.6 °F (36.4 °C)-97.7 °F (36.5 °C)] 97.7 °F (36.5 °C)  Pulse:  [38-62] 45  Resp:  [12-20] 13  SpO2:  [95 %-100 %] 96 %  BP: (116-201)/(57-95) 167/78     Weight: 77.6 kg (171 lb)  Body mass index is 26 kg/m².    Physical Exam   Constitutional: He is oriented to person, place, and time. He appears well-developed and well-nourished.   HENT:   Head: Normocephalic and atraumatic.   Mouth/Throat: Oropharynx is clear and moist.   Eyes: Lids are normal. Pupils are equal, round, and reactive to light. Right eye exhibits no chemosis. Right conjunctiva is injected. Right eye exhibits abnormal extraocular motion.   Neck: Normal range of motion. Neck supple.   Cardiovascular: Regular rhythm, normal heart sounds and intact distal pulses. Bradycardia present.   Pulmonary/Chest: Effort normal and breath sounds normal.   Abdominal: Soft. Bowel sounds are normal. There is no tenderness.   Musculoskeletal: Normal range of motion.   Neurological: He is alert and oriented to person, place, and time. He has normal strength.   Psychiatric: He has a normal mood and affect.         CRANIAL NERVES     CN III, IV, VI   Pupils are equal, round, and reactive to light.       Significant Labs:   CBC:   Recent Labs   Lab 03/14/19  1118 03/14/19  1500   WBC 5.88 6.17   HGB 14.1 12.9*   HCT 41.4 38.6*    215     CMP:   Recent Labs   Lab 03/14/19  1118 03/14/19  1500    133*   K 4.4 5.1    102   CO2 28 25    107   BUN 14 15   CREATININE 1.3 1.2   CALCIUM 10.1 9.4   PROT 8.0 7.2   ALBUMIN 3.7 3.4*   BILITOT 0.6 0.6   ALKPHOS 106 102   AST 22 27   ALT 19 20   ANIONGAP 7* 6*   EGFRNONAA 50* 55.6*     Magnesium: No results for  input(s): MG in the last 48 hours.  Troponin: No results for input(s): TROPONINI in the last 48 hours.  TSH:   Recent Labs   Lab 03/14/19  1500   TSH 0.804       Significant Imaging: I have reviewed all pertinent imaging results/findings within the past 24 hours.     Imaging Results          MRI Brain Without Contrast (Final result)  Result time 03/14/19 19:16:29    Final result by Amarilis Ochoa MD (03/14/19 19:16:29)                 Impression:      Possible subcortical punctate acute infarct versus microvascular ischemic change in the left temporoparietal region.    Senescent changes and moderate chronic microvascular ischemic disease.    Electronically signed by resident: Law Mcfarland  Date:    03/14/2019  Time:    18:40    Electronically signed by: Amarilis Ochoa MD  Date:    03/14/2019  Time:    19:16             Narrative:    EXAMINATION:  MRI BRAIN WITHOUT CONTRAST    CLINICAL HISTORY:  Stroke;    TECHNIQUE:  Multiplanar multisequence MR imaging of the brain was performed without the use of intravenous contrast.    COMPARISON:  CTA head 03/14/2019    FINDINGS:  There is mild generalized cerebral volume loss and mild compensatory expansion of the lateral ventricles.  No evidence of hydrocephalus.    In the left subcortical temporoparietal region there is a punctate focus of diffusion restriction with corresponding hyperintense T2 FLAIR signal although no definite low ADC signal to definitely suggest acute infarct located.  There is patchy and confluent T2 FLAIR signal hyperintensity throughout the supratentorial periventricular and subcortical white matter. No gradient signal abnormality to suggest hemorrhage.  No extra-axial fluid collections.  Major intracranial T2 flow voids are preserved.  Bone marrow signal intensity is unremarkable.                               X-Ray Chest AP Portable (Final result)  Result time 03/14/19 16:08:58    Final result by Johnny Hardin MD (03/14/19 16:08:58)                  Impression:      No radiographic acute intrathoracic process seen.      Electronically signed by: Johnny Hardin MD  Date:    03/14/2019  Time:    16:08             Narrative:    EXAMINATION:  XR CHEST AP PORTABLE    CLINICAL HISTORY:  Stroke;    TECHNIQUE:  Single frontal view of the chest was performed.    COMPARISON:  CT thorax 06/26/2018    FINDINGS:  Monitoring leads overlie the chest.  Cardiomediastinal silhouette is midline and within normal limits.  Chronic mild nonspecific elevation of the right hemidiaphragm.  Few scattered linear opacities consistent with subsegmental scarring versus atelectasis.  The lungs are otherwise well expanded without focal consolidation, pleural effusion or pneumothorax.  No acute osseous process seen.  PA and lateral views can be obtained.                               CTA STROKE MULTI-PHASE (Final result)     Abnormal  Result time 03/14/19 15:53:28    Final result by Darshan Tilley MD (03/14/19 15:53:28)                 Impression:      No evidence of high-grade stenosis or major vessel occlusion.    Moderate supratentorial findings of chronic microvascular ischemic disease.    3 mm micronodule in the upper pole of right lung.  For a solid nodule <6 mm, Fleischner Society 2017 guidelines recommend no routine follow up for a low risk patient, or follow-up with non-contrast chest CT at 12 months in a high risk patient.    Multilevel facet degeneration is present throughout the cervical spine with multilevel predominantly left-sided foraminal stenoses.    This report was flagged in Epic as containing an incidental finding.    Electronically signed by resident: Kelly Woodruff  Date:    03/14/2019  Time:    15:23    Electronically signed by: Darshan Tilley MD  Date:    03/14/2019  Time:    15:53             Narrative:    EXAMINATION:  CTA STROKE MULTI-PHASE    CLINICAL HISTORY:  Stroke    TECHNIQUE:  Non contrast low dose axial images were obtained thought the head. CT angiogram  was performed from the level of the joshua to the top of the head following the IV administration of 100mL of Omnipaque 350.   Sagittal and coronal reconstructions and maximum intensity projection reconstructions were performed. Arterial stenosis percentages are based on NASCET measurement criteria.  Two additional phases of immediate post-contrast CTA images were performed through the head alone.    COMPARISON:  CT head 03/14/2019.  CT chest 06/26/2018    FINDINGS:  Intracranial compartment: Prominence of the ventricles and sulci compatible with mild generalized cerebral volume loss.  No hydrocephalus. No extra-axial blood or fluid collections. Moderate degree of patchy and confluent hypoattenuation in the supratentorial periventricular and subcortical white matter most likely relates to sequelae of chronic microvascular ischemic disease.  No parenchymal mass, hemorrhage, edema, or major vascular distribution infarct.    Skull/Extracranial Contents (limited evaluation): No fracture. Mastoid air cells and paranasal sinuses are essentially clear.    Non-Vascular Structures of the Neck/Thoracic Inlet (limited evaluation): 3 mm micronodule in the upper lobe of the right lung is unchanged.  Multilevel facet degeneration is present throughout the cervical spine with multilevel predominantly left-sided foraminal stenoses.    Aorta: Normal 3 vessel arch.    Extracranial carotid circulation: Minimal atherosclerotic calcification at the carotid bulbs.  No hemodynamically significant stenosis, aneurysmal dilatation, or dissection.    Extracranial vertebral circulation: No hemodynamically significant stenosis, aneurysmal dilatation, or dissection.    Intracranial Arteries: Few air foci in the cavernous sinus are likely related to IV injection.  No focal high-grade stenosis, occlusion, or aneurysm.    Venous structures (limited evaluation): Unremarkable.

## 2019-03-15 NOTE — ED NOTES
D/c instructions provided by MD, reviewed by RN with family. Prescriptions called in to pharmacy [facility] per family request. Patient denies pain/sob/weakness/dizziness. Able to stand and ambulate without any change in status. Family escort patient to vehicle with RN assist.

## 2019-03-15 NOTE — PLAN OF CARE
Problem: Occupational Therapy Goal  Goal: Occupational Therapy Goal  OT evaluation completed.  ALEX Jacobo  3/15/2019

## 2019-03-15 NOTE — ASSESSMENT & PLAN NOTE
- EKG: sinus bradycardia, HR 50. No prior EKGs to compare  - symptomatic w/dizziness. Noted to have bradycardia dating back to 6/2018 with HR 50s  - r/o infectious etiologies: CXR, UA  - EP consult to evaluate for PPM placement, appreciate assistance  - Cardiology saw and evaluated patient -> did not believe patient had symptomatic bradycardia. Attributed patient's symptoms of dizziness and ataxia to chronic vertigo in the setting of chronic bradycardia. No recommendations provided.    Plan:  - Will d/c patient home with 30-day Holter monitor  - Pending results of TTE (if significant LAE seen), will need electrophysiology follow-up

## 2019-03-15 NOTE — ASSESSMENT & PLAN NOTE
- 84 y/o M with acute-onset R monocular vision loss, associated with several day Hx of ataxia. Transferred from [place] via telestroke for vascular neurology evaluation. Out of the window for tPA. CTH negative. No LVO on CTA-MP.    - Vascular neurology consulted in the ED: Not very consistent with a vascular territory, but should consider rare situation of embolic infarcts to posterior circulation and retinal artery. More likely ocular pathology.  --  MRI Brain WO -> Possible subcortical punctate acute infarct versus microvascular ischemic change in the left temporoparietal region. Per Vascular Neuro, most likely an incidental finding and not likely cause of patient's current symptoms  -- ophthalmology consulted for investigating ocular pathologies, appreciate assistance  -- Antithrombotics for secondary stroke prevention: Antiplatelets: Recommend Aspirin: 81 mg daily, holding until evaluated by EP for possible pacemaker.   -- Patient passed bedside swallow study, will allow patient to eat   -- Statins: Atorvastatin- 40 mg daily  -- TSH wnl  --  TTE w/ bubble study -> normal study, no significant findings  -- VTE prophylaxis: Heparin 5000 units SQ every 8 hours, pending EP recs  -- BP parameters:  SBP <220    Plan:  - D/c home with close outpatient follow-up in Vascular Neurology clinic. Ambulatory referral has been sent  - Start Plavix 75 mg daily for 30 days  - Start ASA 81 mg PO daily  - Start rosuvastatin 20 mg PO daily

## 2019-03-15 NOTE — HOSPITAL COURSE
Mr. Coker was admitted to hospital medicine team 3 for further monitoring and management of his symptomatic bradycardia. Primary concern was to evaluate for and rule out a cardiac etiology for the patient's symptoms. MRI Brain was performed and showed a possible subcortical punctate acute infarct versus microvascular ischemic change in the left temporoparietal region. Vascular neurology service viewed this finding as incidental but still indicative that the patient has had two separate  infarcts within the last 6-8 months (when compared to MRI Brain from 06/2018). Patient remained bradycardic with HR in 40-50's while regine stayed overnight in the ED. U/S of bilateral carotid arteries showed no significant stenosis (39% blockage on both sides). Patient ambulated well with nursing staff and physical therapy with increasing HR (up to 70's) w/o any further pain or dizziness. Cardiology service evaluated patient and believed the patient's symptoms to be due to chronic vertigo, not symptomatic bradycardia. TTE w/ bubble study showed ....

## 2019-03-15 NOTE — HPI
"Reason for Consult:   83-year-old male with HTN, hypothyroidism, and myathesnia who presented to neurology clinics complaining of vertigo and complete vision loss on R eye.     medical history of myasthenia gravis, glaucoma, hypothyroidism, and HTN? (on lisinopril in the past) presenting to the ED for right-sided vision loss and dizziness.  Pt initially presented to his neurologist's office this morning but was referred and later seen at [facility] in the ED where a telestroke consult was completed. Recommended transfer to [facility] for stroke eval. Pt states that his dizziness began on Saturday, 03/09, described as the room spinning around him that is made worse upon lying down. Patient reports that he had a similar episode about 40 years ago at which time he saw a neurologist but never received a full workup as it did not become a lingering problem. In addition to the patient's current dizziness, the patient reports a transient loss of vision yesterday that lasted for a few seconds, described as "looking through a kaleidoscope." This morning, he reports that around 8:00 am he lost complete vision in his right eye, only able to see light and blurry shapes out of the lateral corner of his eye. Daughter at bedside reports that he has ahd surgery for glaucoma in the past. Patient denies any past cardiac history. Patient denies any fevers, chills, headache, eye pain, CP, SOB, abdominal pain, dysuria, weakness or numbness in his extremities. Per family at bedside, patient at baseline is a very active, independent man who up until Saturday was in his usual state of health.     Upon arrival to the ED, patient was found to be afebrile, bradycardic with HR (40-50's), and hypertensive with SBP > 170. Initially evaluated with a Head CT that was negative for CVA. No LVO on CTA-MP. Vascular Neuro believed pt's acute loss of vision was moire likely an ocular pathology, but would follow up CT with an MRI for further " re-assurance. Ophthalmology consulted and concerned for central retinal artery occlusion. MRI Brain pending. Initial lab work was unremarkable for any electrolyte abnormalities and TSH was wnl. On exam, patient remains bradycardic with HR in low 40's, occasional complaining of dizziness. Per chart review, it appears that patient has been bradycardic with HR in 50-60's since July 2018, never worked up by a cardiologist. Further work-up of pt's symptomatic bradycardia in regards to a cardiac etiology including TTE w/ bubble study, U/S b/l carotids, and EP consult with possible intervention for a pacemaker placement have been ordered.

## 2019-03-15 NOTE — ED NOTES
Paged [facility] medicine to come speak with patient and update patient/patient family with plan of care. Awaiting return call.

## 2019-03-15 NOTE — DISCHARGE SUMMARY
"[facility] Hospital Medicine  Discharge Summary      Patient Name: Sekou Coker  MRN: [id number]  Admission Date: 3/14/2019  Hospital Length of Stay: 1 days  Discharge Date and Time: No discharge date for patient encounter.  Attending Physician: Mervat Lama MD   Discharging Provider: Shar Alcaraz MD  Primary Care Provider: [facility] Medicine Team: [facility] MED 3 Shar Alcaraz MD    HPI:   Patient is a 83-year-old male with medical history of myasthenia gravis, glaucoma, hypothyroidism, and HTN? (on lisinopril in the past) presenting to the ED for right-sided vision loss and dizziness.  Pt initially presented to his neurologist's office this morning but was referred and later seen at [facility] in the ED where a telestroke consult was completed. Recommended transfer to [facility] for stroke eval. Pt states that his dizziness began on Saturday, 03/09, described as the room spinning around him that is made worse upon lying down. Patient reports that he had a similar episode about 40 years ago at which time he saw a neurologist but never received a full workup as it did not become a lingering problem. In addition to the patient's current dizziness, the patient reports a transient loss of vision yesterday that lasted for a few seconds, described as "looking through a kaleidoscope." This morning, he reports that around 8:00 am he lost complete vision in his right eye, only able to see light and blurry shapes out of the lateral corner of his eye. Daughter at bedside reports that he has ahd surgery for glaucoma in the past. Patient denies any past cardiac history. Patient denies any fevers, chills, headache, eye pain, CP, SOB, abdominal pain, dysuria, weakness or numbness in his extremities. Per family at bedside, patient at baseline is a very active, independent man who up until Saturday was in his usual state of health.    Upon arrival to the ED, " patient was found to be afebrile, bradycardic with HR (40-50's), and hypertensive with SBP > 170. Initially evaluated with a Head CT that was negative for CVA. No LVO on CTA-MP. Vascular Neuro believed pt's acute loss of vision was moire likely an ocular pathology, but would follow up CT with an MRI for further re-assurance. Ophthalmology consulted and concerned for central retinal artery occlusion. MRI Brain pending. Initial lab work was unremarkable for any electrolyte abnormalities and TSH was wnl. On exam, patient remains bradycardic with HR in low 40's, occasional complaining of dizziness. Per chart review, it appears that patient has been bradycardic with HR in 50-60's since July 2018, never worked up by a cardiologist. Further work-up of pt's symptomatic bradycardia in regards to a cardiac etiology including TTE w/ bubble study, U/S b/l carotids, and EP consult with possible intervention for a pacemaker placement have been ordered.          * No surgery found *      Hospital Course:   Mr. Coker was admitted to hospital medicine team 3 for further monitoring and management of his symptomatic bradycardia. Primary concern was to evaluate for and rule out a cardiac etiology for the patient's symptoms. MRI Brain was performed and showed a possible subcortical punctate acute infarct versus microvascular ischemic change in the left temporoparietal region. Vascular neurology service viewed this finding as incidental but still indicative that the patient has had two separate  infarcts within the last 6-8 months (when compared to MRI Brain from 06/2018). Patient remained bradycardic with HR in 40-50's while regien stayed overnight in the ED. U/S of bilateral carotid arteries showed no significant stenosis (39% blockage on both sides). Patient ambulated well with nursing staff and physical therapy with increasing HR (up to 70's) w/o any further pain or dizziness. Cardiology service evaluated patient and believed the  patient's symptoms to be due to chronic vertigo, not symptomatic bradycardia. TTE w/ bubble study showed ....   Interval History: No acute events overnight. Patient remained bradycardic with HR 40-50's and intermittently hypertensive throughout the night. Patient reports feeling fine this morning, not complaining of any dizziness or pain. Patient ambulated well with assistance with nursing staff and physical therapy with increasing HR and no complaint of symptoms. TTE w/ bubble study pending.     Review of Systems   Constitutional: Negative for chills and fever.   HENT: Negative for ear discharge, ear pain and rhinorrhea.    Eyes: Positive for visual disturbance. Negative for photophobia and pain.   Respiratory: Negative for cough and shortness of breath.    Cardiovascular: Negative for chest pain and palpitations.   Gastrointestinal: Negative for abdominal pain, nausea and vomiting.   Genitourinary: Positive for testicular pain. Negative for dysuria.   Musculoskeletal: Negative for back pain and myalgias.   Skin: Negative for color change.   Neurological: Positive for dizziness (  vertigo). Negative for syncope, light-headedness and headaches.   Psychiatric/Behavioral: Negative for agitation and confusion.     Objective:     Vital Signs (Most Recent):  Temp: 98.5 °F (36.9 °C) (03/15/19 1030)  Pulse: (!) 53 (03/15/19 1030)  Resp: 18 (03/15/19 1030)  BP: (!) 163/72 (03/15/19 1030)  SpO2: 97 % (03/15/19 0704) Vital Signs (24h Range):  Temp:  [97.7 °F (36.5 °C)-98.5 °F (36.9 °C)] 98.5 °F (36.9 °C)  Pulse:  [38-54] 53  Resp:  [10-23] 18  SpO2:  [92 %-100 %] 97 %  BP: ()/(52-82) 163/72     Weight: 77.6 kg (171 lb)  Body mass index is 26 kg/m².    Intake/Output Summary (Last 24 hours) at 3/15/2019 2332  Last data filed at 3/15/2019 0754  Gross per 24 hour   Intake --   Output 275 ml   Net -275 ml      Physical Exam   Constitutional: He is oriented to person, place, and time. He appears well-developed and  well-nourished.   HENT:   Head: Normocephalic and atraumatic.   Mouth/Throat: Oropharynx is clear and moist.   Eyes: Lids are normal. Pupils are equal, round, and reactive to light. Right eye exhibits no chemosis. Right conjunctiva is injected. Right eye exhibits abnormal extraocular motion.   Neck: Normal range of motion. Neck supple.   Cardiovascular: Regular rhythm, normal heart sounds and intact distal pulses. Bradycardia present.   Pulmonary/Chest: Effort normal and breath sounds normal.   Abdominal: Soft. Bowel sounds are normal. There is no tenderness.   Musculoskeletal: Normal range of motion.   Neurological: He is alert and oriented to person, place, and time. He has normal strength.   Psychiatric: He has a normal mood and affect. 3    Consults:   Consults (From admission, onward)        Status Ordering Provider     Inpatient consult to Ophthalmology  Once     Provider:  (Not yet assigned)    BEBA Leos     Inpatient consult to Vascular (Stroke) Neurology  Once     Provider:  (Not yet assigned)    Completed BEBA TAYLOR          * Bradycardia    - EKG: sinus bradycardia, HR 50. No prior EKGs to compare  - symptomatic w/dizziness. Noted to have bradycardia dating back to 6/2018 with HR 50s  - r/o infectious etiologies: CXR, UA  - EP consult to evaluate for PPM placement, appreciate assistance  - Cardiology saw and evaluated patient -> did not believe patient had symptomatic bradycardia. Attributed patient's symptoms of dizziness and ataxia to chronic vertigo in the setting of chronic bradycardia. No recommendations provided.    Plan:  - Will d/c patient home with 30-day Holter monitor  - Pending results of TTE (if significant LAE seen), will need electrophysiology follow-up         Debility    - PT/OT recommending  PT  - Will set up with help of CM       Ataxia    - Based on patient's descriptions of symptoms of the room spinning, seems like patient could be experiencing BPPV.  - MRI  Brain was negative for any kind of cerebellar infarcts    Plan:  - Patient will follow up with Neurologist back in [facility]       Glaucoma    - home meds: brimodinine and timolol eye drops  - opthalmology consulted, see above       Hypothyroidism    - TSH 0.804 wnl    Plan:  - Cont synthroid 50 mcg daily       Visual loss, right eye    - 84 y/o M with acute-onset R monocular vision loss, associated with several day Hx of ataxia. Transferred from [place] via telestroke for vascular neurology evaluation. Out of the window for tPA. CTH negative. No LVO on CTA-MP.    - Vascular neurology consulted in the ED: Not very consistent with a vascular territory, but should consider rare situation of embolic infarcts to posterior circulation and retinal artery. More likely ocular pathology.  --  MRI Brain WO -> Possible subcortical punctate acute infarct versus microvascular ischemic change in the left temporoparietal region. Per Vascular Neuro, most likely an incidental finding and not likely cause of patient's current symptoms  -- ophthalmology consulted for investigating ocular pathologies, appreciate assistance  -- Antithrombotics for secondary stroke prevention: Antiplatelets: Recommend Aspirin: 81 mg daily, holding until evaluated by EP for possible pacemaker.   -- Patient passed bedside swallow study, will allow patient to eat   -- Statins: Atorvastatin- 40 mg daily  -- TSH wnl  --  TTE w/ bubble study -> normal study, no significant findings  -- VTE prophylaxis: Heparin 5000 units SQ every 8 hours, pending EP recs  -- BP parameters:  SBP <220    Plan:  - D/c home with close outpatient follow-up in Vascular Neurology clinic. Ambulatory referral has been sent  - Start Plavix 75 mg daily for 30 days  - Start ASA 81 mg PO daily  - Start rosuvastatin 20 mg PO daily         Elevated BP without diagnosis of hypertension    Plan:  - Start amlodipine 5 mg daily  - Take BP log at home for at least 1 week, 2 measurements daily  (AM and PM)  - Follow-up with PCP for further medication adjustment       Final Active Diagnoses:    Diagnosis Date Noted POA    PRINCIPAL PROBLEM:  Bradycardia [R00.1] 03/14/2019 Yes    Acute loss of vision, right [H53.131] 03/14/2019 Yes    Ataxia [R27.0] 03/14/2019 Yes    Debility [R53.81] 03/14/2019 Yes    Hypothyroidism [E03.9] 03/14/2019 Yes    Glaucoma [H40.9] 03/14/2019 Yes    Visual loss, right eye [H54.61] 03/14/2019 Yes    Central retinal artery occlusion of right eye [H34.11] 03/14/2019 Unknown    Neovascularization of optic disc, 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 Date: 04/15/19     Order Specific Question Answer Comments   Cardiac Event Monitor Auto Trigger        Significant Diagnostic Studies: Labs:   CMP   Recent Labs   Lab 03/14/19  1118 03/14/19  1500 03/15/19  0311    133* 139   K 4.4 5.1 4.5    102 104   CO2 28 25 29    107 110   BUN 14 15 18   CREATININE 1.3 1.2 1.3   CALCIUM 10.1 9.4 9.6   PROT 8.0 7.2 7.2   ALBUMIN 3.7 3.4* 3.3*   BILITOT 0.6 0.6 0.5   ALKPHOS 106 102 101   AST 22 27 19   ALT 19 20 18   ANIONGAP 7* 6* 6*   ESTGFRAFRICA 58* >60.0 58.3*   EGFRNONAA 50* 55.6* 50.5*   , CBC   Recent Labs   Lab 03/14/19  1118 03/14/19  1500 03/15/19  0311   WBC 5.88 6.17 7.59   HGB 14.1 12.9* 13.0*   HCT 41.4 38.6* 39.0*    215 216   , Lipid Panel   Lab Results   Component Value Date    CHOL 187 03/14/2019    HDL  31 (L) 03/14/2019    LDLCALC 139.8 03/14/2019    TRIG 81 03/14/2019    CHOLHDL 16.6 (L) 03/14/2019    and Troponin No results for input(s): TROPONINI in the last 168 hours.  Imaging Results          US Carotid Bilateral (Final result)  Result time 03/15/19 10:00:07    Final result by Nithin Stone MD (03/15/19 10:00:07)                 Impression:      1 - 39% stenosis of the right internal carotid artery.    1 - 39% stenosis of the left internal carotid artery.    Mild homogeneous bilateral atherosclerosis at the carotid bifurcations    Electronically signed by resident: Nithin De Leon  Date:    03/15/2019  Time:    09:28    Electronically signed by: Nithin Stone MD  Date:    03/15/2019  Time:    10:00             Narrative:    EXAMINATION:  US CAROTID BILATERAL    CLINICAL HISTORY:  Cerebral infarction, unspecified    TECHNIQUE:  Grayscale and color spectral Doppler ultrasound imaging of the carotid and vertebral artery systems bilaterally.    COMPARISON:  CTA stroke multiphase 03/14/2019    FINDINGS:  Right:    Internal Carotid Artery (ICA) peak systolic velocity 116 cm/sec    Internal Carotid Artery (ICA) end-diastolic velocity 28 cm/sec    Common Carotid Artery (CCA) peak systolic velocity 104 cm/sec    Common Carotid Artery (CCA) end-diastolic velocity 19 cm/sec    ICA/CCA peak systolic velocity ratio: 1.12    ICA/CCA end-diastolic velocity ratio: 1.47    Plaque formation: Homogeneous    Vertebral artery: Antegrade flow and normal waveform.    Left:    Internal Carotid Artery (ICA)  peak systolic velocity 103 cm/sec    Internal Carotid Artery (ICA) end-diastolic velocity 27 cm/sec    Common Carotid Artery (CCA) peak systolic velocity 114 cm/sec    Common Carotid Artery (CCA) end-diastolic velocity 21 cm/sec    ICA/CCA peak systolic velocity ratio: 0.90    ICA/CCA end diastolic velocity ratio: 1.29    Plaque formation: Homogeneous    Vertebral artery: Antegrade flow and normal waveform.                                MRI Brain Without Contrast (Final result)  Result time 03/14/19 19:16:29    Final result by Amarilis Ochoa MD (03/14/19 19:16:29)                 Impression:      Possible subcortical punctate acute infarct versus microvascular ischemic change in the left temporoparietal region.    Senescent changes and moderate chronic microvascular ischemic disease.    Electronically signed by resident: Law Mcfarland  Date:    03/14/2019  Time:    18:40    Electronically signed by: Amarilis Ochoa MD  Date:    03/14/2019  Time:    19:16             Narrative:    EXAMINATION:  MRI BRAIN WITHOUT CONTRAST    CLINICAL HISTORY:  Stroke;    TECHNIQUE:  Multiplanar multisequence MR imaging of the brain was performed without the use of intravenous contrast.    COMPARISON:  CTA head 03/14/2019    FINDINGS:  There is mild generalized cerebral volume loss and mild compensatory expansion of the lateral ventricles.  No evidence of hydrocephalus.    In the left subcortical temporoparietal region there is a punctate focus of diffusion restriction with corresponding hyperintense T2 FLAIR signal although no definite low ADC signal to definitely suggest acute infarct located.  There is patchy and confluent T2 FLAIR signal hyperintensity throughout the supratentorial periventricular and subcortical white matter. No gradient signal abnormality to suggest hemorrhage.  No extra-axial fluid collections.  Major intracranial T2 flow voids are preserved.  Bone marrow signal intensity is unremarkable.                               X-Ray Chest AP Portable (Final result)  Result time 03/14/19 16:08:58    Final result by Johnny Hardin MD (03/14/19 16:08:58)                 Impression:      No radiographic acute intrathoracic process seen.      Electronically signed by: Johnny Hardin MD  Date:    03/14/2019  Time:    16:08             Narrative:    EXAMINATION:  XR CHEST AP PORTABLE    CLINICAL HISTORY:  Stroke;    TECHNIQUE:  Single  frontal view of the chest was performed.    COMPARISON:  CT thorax 06/26/2018    FINDINGS:  Monitoring leads overlie the chest.  Cardiomediastinal silhouette is midline and within normal limits.  Chronic mild nonspecific elevation of the right hemidiaphragm.  Few scattered linear opacities consistent with subsegmental scarring versus atelectasis.  The lungs are otherwise well expanded without focal consolidation, pleural effusion or pneumothorax.  No acute osseous process seen.  PA and lateral views can be obtained.                               CTA STROKE MULTI-PHASE (Final result)     Abnormal  Result time 03/14/19 15:53:28    Final result by Darshan Tilley MD (03/14/19 15:53:28)                 Impression:      No evidence of high-grade stenosis or major vessel occlusion.    Moderate supratentorial findings of chronic microvascular ischemic disease.    3 mm micronodule in the upper pole of right lung.  For a solid nodule <6 mm, Fleischner Society 2017 guidelines recommend no routine follow up for a low risk patient, or follow-up with non-contrast chest CT at 12 months in a high risk patient.    Multilevel facet degeneration is present throughout the cervical spine with multilevel predominantly left-sided foraminal stenoses.    This report was flagged in Epic as containing an incidental finding.    Electronically signed by resident: Kelly Woodruff  Date:    03/14/2019  Time:    15:23    Electronically signed by: Darshan Tilley MD  Date:    03/14/2019  Time:    15:53             Narrative:    EXAMINATION:  CTA STROKE MULTI-PHASE    CLINICAL HISTORY:  Stroke    TECHNIQUE:  Non contrast low dose axial images were obtained thought the head. CT angiogram was performed from the level of the joshua to the top of the head following the IV administration of 100mL of Omnipaque 350.   Sagittal and coronal reconstructions and maximum intensity projection reconstructions were performed. Arterial stenosis percentages are based on  NASCET measurement criteria.  Two additional phases of immediate post-contrast CTA images were performed through the head alone.    COMPARISON:  CT head 03/14/2019.  CT chest 06/26/2018    FINDINGS:  Intracranial compartment: Prominence of the ventricles and sulci compatible with mild generalized cerebral volume loss.  No hydrocephalus. No extra-axial blood or fluid collections. Moderate degree of patchy and confluent hypoattenuation in the supratentorial periventricular and subcortical white matter most likely relates to sequelae of chronic microvascular ischemic disease.  No parenchymal mass, hemorrhage, edema, or major vascular distribution infarct.    Skull/Extracranial Contents (limited evaluation): No fracture. Mastoid air cells and paranasal sinuses are essentially clear.    Non-Vascular Structures of the Neck/Thoracic Inlet (limited evaluation): 3 mm micronodule in the upper lobe of the right lung is unchanged.  Multilevel facet degeneration is present throughout the cervical spine with multilevel predominantly left-sided foraminal stenoses.    Aorta: Normal 3 vessel arch.    Extracranial carotid circulation: Minimal atherosclerotic calcification at the carotid bulbs.  No hemodynamically significant stenosis, aneurysmal dilatation, or dissection.    Extracranial vertebral circulation: No hemodynamically significant stenosis, aneurysmal dilatation, or dissection.    Intracranial Arteries: Few air foci in the cavernous sinus are likely related to IV injection.  No focal high-grade stenosis, occlusion, or aneurysm.    Venous structures (limited evaluation): Unremarkable.                                  Cardiac Graphics: ECG: sinus bradycardia and Echocardiogram:   Transthoracic echo (TTE) complete (Cupid Only):   Results for orders placed or performed during the hospital encounter of 03/14/19   Transthoracic echo (TTE) complete (Cupid Only)   Result Value Ref Range    Ascending aorta 3.02 cm    STJ 2.46 cm     AV mean gradient 3.86 mmHg    Ao peak luis 1.46 m/s    Ao VTI 28.54 cm    IVRT 0.10 msec    IVS 0.98 0.6 - 1.1 cm    LA size 3.38 cm    Left Atrium Major Axis 5.28 cm    Left Atrium Minor Axis 5.22 cm    LVIDD 4.18 3.5 - 6.0 cm    LVIDS 2.85 2.1 - 4.0 cm    LVOT diameter 2.11 cm    LVOT peak VTI 25.98 cm    PW 0.99 0.6 - 1.1 cm    MV Peak A Luis 1.18 m/s    E wave decelartion time 243.54 msec    MV Peak E Luis 0.78 m/s    PV Peak D Luis 0.47 m/s    PV Peak S Luis 0.57 m/s    RA Major Axis 5.14 cm    RA Width 3.50 cm    RVDD 4.03 cm    Sinus 2.96 cm    TAPSE 2.14 cm    TR Max Luis 2.69 m/s    TDI LATERAL 0.07     TDI SEPTAL 0.05     LA WIDTH 3.24 cm    LV Diastolic Volume 77.75 mL    LV Systolic Volume 30.82 mL    RV S' 13.11 m/s    LVOT peak luis [id number] m/s    LV LATERAL E/E' RATIO 11.14     LV SEPTAL E/E' RATIO 15.60     FS 32 %    LA volume 48.87 cm3    LV mass 133.37 g    Left Ventricle Relative Wall Thickness 0.47 cm    AV valve area 3.18 cm2    AV Velocity Ratio 0.75     AV index (prosthetic) 0.91     E/A ratio 0.66     Mean e' 0.06     Pulm vein S/D ratio 1.21     LVOT area 3.49 cm2    LVOT stroke volume 90.80 cm3    AV peak gradient 8.53 mmHg    E/E' ratio 13.00     LV Systolic Volume Index 16.1 mL/m2    LV Diastolic Volume Index 40.66 mL/m2    LA Volume Index 25.6 mL/m2    LV Mass Index 69.7 g/m2    Triscuspid Valve Regurgitation Peak Gradient 28.94 mmHg    BSA 1.93 m2    Right Atrial Pressure (from IVC) 3 mmHg    TV rest pulmonary artery pressure 32 mmHg       Pending Diagnostic Studies:     None         Medications:  Reconciled Home Medications:      Medication List      START taking these medications    amLODIPine 5 MG tablet  Commonly known as:  NORVASC  Take 1 tablet (5 mg total) by mouth once daily.     aspirin 81 MG EC tablet  Commonly known as:  ECOTRIN  Take 1 tablet (81 mg total) by mouth once daily.     clopidogrel 75 mg tablet  Commonly known as:  PLAVIX  Take 1 tablet (75 mg total) by mouth  once daily.     rosuvastatin 20 MG tablet  Commonly known as:  CRESTOR  Take 1 tablet (20 mg total) by mouth once daily.        CONTINUE taking these medications    brimonidine 0.1% 0.1 % Drop  Commonly known as:  ALPHAGAN P  Place 1 drop into both eyes 3 (three) times daily.     etodolac 300 MG Cap  Commonly known as:  LODINE  Take 300 mg by mouth 2 (two) times daily as needed.     levothyroxine 50 MCG tablet  Commonly known as:  SYNTHROID  Take 50 mcg by mouth once daily.     timolol 0.5 % ophthalmic solution  Commonly known as:  BETIMOL  1 drop 2 (two) times daily.            Indwelling Lines/Drains at time of discharge:   Lines/Drains/Airways          None          Time spent on the discharge of patient: 30 minutes  Patient was seen and examined on the date of discharge and determined to be suitable for discharge.         Shar Alcaraz MD  Department of Hospital Medicine  [facility]

## 2019-03-16 NOTE — PROVIDER PROGRESS NOTES - EMERGENCY DEPT.
Encounter Date: 3/14/2019    ED Physician Progress Notes           Patient's wife called the ED for question pertaining to new Rx. She states the pharmacy gave her a prescription for lisinopril, in addition to amlodipine, aspirin, crestor and plavix. She has not seen this new medication on any discharge medications list.  I reviewed his chart and there is no mention of ACE initiation. I told the patient to hold off on starting this medication, and to f/u with PCP regarding its use. Wife agreed to plan of care and voiced understanding.    Cindy Ji PA-C  03/17/2019

## 2019-12-31 NOTE — PLAN OF CARE
[facility]    HOME HEALTH ORDERS  FACE TO FACE ENCOUNTER    Patient Name: Sekou Coker  YOB: 1935    PCP: [facility]   PCP Address: [address] [place] [address]  PCP Phone Number: [phone]  PCP Fax: [phone]    Encounter Date: 03/15/2019    Admit to Home Health    Diagnoses:  Active Hospital Problems    Diagnosis  POA    *Bradycardia [R00.1]  Yes    Acute loss of vision, right [H53.131]  Yes    Ataxia [R27.0]  Yes    Debility [R53.81]  Yes    Hypothyroidism [E03.9]  Yes    Glaucoma [H40.9]  Yes    Visual loss, right eye [H54.61]  Yes    Central retinal artery occlusion of right eye [H34.11]  Unknown    Neovascularization of optic disc, 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       Future Appointments   Date Time Provider Department Center   4/30/2019 10:45 AM Nohelia Nuñez NP [facility] NEURO [facility]           I have seen and examined this patient face to face today. My clinical findings that support the need for the home health skilled services and home bound status are the following:  Weakness/numbness causing balance and gait disturbance due to Weakness/Debility and acute vision loss  making it taxing to leave home.    Allergies:Review of patient's allergies indicates:  No Known Allergies    Diet: regular diet    Activities: activity as tolerated    Nursing:   SN to complete comprehensive assessment including routine vital signs. Instruct on disease process and s/s of complications to report to MD. Review/verify medication list sent home with the patient at time of discharge  and instruct patient/caregiver as needed. Frequency may be adjusted depending on start of care date.    Notify MD if SBP > 160 or < 90; DBP > 90 or < 50;  HR > 120 or < 50; Temp > 101;      CONSULTS:    Physical Therapy to evaluate and treat. Evaluate for home safety and equipment needs; Establish/upgrade home exercise program. Perform / instruct on therapeutic exercises, gait training, transfer training, and Range of Motion.    MISCELLANEOUS CARE:  N/A    WOUND CARE ORDERS  n/a      Medications: Review discharge medications with patient and family and provide education.      Current Discharge Medication List      START taking these medications    Details   aspirin 325 MG tablet Take 1 tablet (325 mg total) by mouth once daily.  Qty: 90 tablet, Refills: 3      clopidogrel (PLAVIX) 75 mg tablet Take 1 tablet (75 mg total) by mouth once daily.  Qty: 90 tablet, Refills: 3      lisinopril (PRINIVIL,ZESTRIL) 5 MG tablet Take 1 tablet (5 mg total) by mouth once daily.  Qty: 90 tablet, Refills: 3         CONTINUE these medications which have NOT CHANGED    Details   brimonidine 0.1% (ALPHAGAN P) 0.1 % Drop Place 1 drop into both eyes 3 (three) times daily.      etodolac (LODINE) 300 MG Cap Take 300 mg by mouth 2 (two) times daily as needed.       levothyroxine (SYNTHROID) 50 MCG tablet Take 50 mcg by mouth once daily.      timolol 0.5 % ophthalmic solution 1 drop 2 (two) times daily.             I certify that this patient is confined to his home and needs physical therapy.         <--- Click to Launch ICDx for PreOp, PostOp and Procedure